# Patient Record
Sex: FEMALE | Race: WHITE | Employment: FULL TIME | URBAN - METROPOLITAN AREA
[De-identification: names, ages, dates, MRNs, and addresses within clinical notes are randomized per-mention and may not be internally consistent; named-entity substitution may affect disease eponyms.]

---

## 2018-08-28 ENCOUNTER — HOSPITAL ENCOUNTER (OUTPATIENT)
Dept: GENERAL RADIOLOGY | Age: 53
Discharge: HOME | End: 2018-08-30
Payer: COMMERCIAL

## 2018-08-28 DIAGNOSIS — Z12.31 VISIT FOR SCREENING MAMMOGRAM: ICD-10-CM

## 2018-08-28 PROCEDURE — 77063 BREAST TOMOSYNTHESIS BI: CPT

## 2018-09-28 PROBLEM — J06.9 VIRAL URI WITH COUGH: Status: ACTIVE | Noted: 2018-09-28

## 2018-11-24 ENCOUNTER — APPOINTMENT (OUTPATIENT)
Dept: GENERAL RADIOLOGY | Age: 53
End: 2018-11-24
Payer: COMMERCIAL

## 2018-11-24 ENCOUNTER — HOSPITAL ENCOUNTER (EMERGENCY)
Age: 53
Discharge: HOME | End: 2018-11-24
Attending: EMERGENCY MEDICINE
Payer: COMMERCIAL

## 2018-11-24 VITALS
OXYGEN SATURATION: 98 % | HEART RATE: 90 BPM | SYSTOLIC BLOOD PRESSURE: 136 MMHG | BODY MASS INDEX: 23.92 KG/M2 | RESPIRATION RATE: 16 BRPM | DIASTOLIC BLOOD PRESSURE: 90 MMHG | HEIGHT: 63 IN | WEIGHT: 135 LBS | TEMPERATURE: 98.3 F

## 2018-11-24 DIAGNOSIS — S60.222A CONTUSION OF LEFT HAND, INITIAL ENCOUNTER: ICD-10-CM

## 2018-11-24 DIAGNOSIS — S61.012A LACERATION OF LEFT THUMB WITHOUT FOREIGN BODY WITHOUT DAMAGE TO NAIL, INITIAL ENCOUNTER: Primary | ICD-10-CM

## 2018-11-24 PROCEDURE — 73130 X-RAY EXAM OF HAND: CPT

## 2018-11-24 PROCEDURE — 12001 RPR S/N/AX/GEN/TRNK 2.5CM/<: CPT

## 2018-11-24 PROCEDURE — 99282 EMERGENCY DEPT VISIT SF MDM: CPT

## 2018-11-24 RX ORDER — NAPROXEN 500 MG/1
500 TABLET ORAL 2 TIMES DAILY
Qty: 14 TABLET | Refills: 0 | Status: SHIPPED | OUTPATIENT
Start: 2018-11-24 | End: 2020-09-17 | Stop reason: ALTCHOICE

## 2018-11-24 RX ORDER — CEPHALEXIN 500 MG/1
500 CAPSULE ORAL 3 TIMES DAILY
Qty: 21 CAPSULE | Refills: 0 | Status: SHIPPED | OUTPATIENT
Start: 2018-11-24 | End: 2018-12-01

## 2018-11-24 RX ORDER — DIAPER,BRIEF,INFANT-TODD,DISP
EACH MISCELLANEOUS
Status: DISCONTINUED
Start: 2018-11-24 | End: 2018-11-24 | Stop reason: HOSPADM

## 2018-11-24 RX ORDER — DIAPER,BRIEF,INFANT-TODD,DISP
EACH MISCELLANEOUS ONCE
Status: DISCONTINUED | OUTPATIENT
Start: 2018-11-24 | End: 2018-11-24 | Stop reason: HOSPADM

## 2018-11-24 RX ORDER — LIDOCAINE HYDROCHLORIDE 10 MG/ML
20 INJECTION, SOLUTION INFILTRATION; PERINEURAL ONCE
Status: DISCONTINUED | OUTPATIENT
Start: 2018-11-24 | End: 2018-11-24 | Stop reason: HOSPADM

## 2018-11-24 RX ORDER — HYDROCODONE BITARTRATE AND ACETAMINOPHEN 5; 325 MG/1; MG/1
1 TABLET ORAL EVERY 6 HOURS PRN
Qty: 6 TABLET | Refills: 0 | Status: SHIPPED | OUTPATIENT
Start: 2018-11-24 | End: 2018-11-26

## 2018-11-24 ASSESSMENT — PAIN DESCRIPTION - LOCATION: LOCATION: HAND

## 2018-11-24 ASSESSMENT — PAIN DESCRIPTION - DESCRIPTORS: DESCRIPTORS: ACHING;CONSTANT;THROBBING;BURNING

## 2018-11-24 ASSESSMENT — PAIN DESCRIPTION - ORIENTATION: ORIENTATION: LEFT

## 2018-11-24 ASSESSMENT — PAIN DESCRIPTION - FREQUENCY: FREQUENCY: CONTINUOUS

## 2018-11-24 ASSESSMENT — PAIN DESCRIPTION - PAIN TYPE: TYPE: ACUTE PAIN

## 2018-11-24 ASSESSMENT — PAIN DESCRIPTION - ONSET: ONSET: SUDDEN

## 2018-11-24 ASSESSMENT — PAIN DESCRIPTION - PROGRESSION: CLINICAL_PROGRESSION: GRADUALLY WORSENING

## 2018-11-24 ASSESSMENT — PAIN SCALES - GENERAL: PAINLEVEL_OUTOF10: 10

## 2018-11-24 NOTE — ED PROVIDER NOTES
PROCEDURE NOTE  11/24/18       Time: 36    LACERATION REPAIR  Risks, benefits and alternatives (for applicable procedures below) described. Performed By: LEIGHTON Coulter CNP. Laceration #: 1. Location: left thumb dorum  Length: 1 cm. The wound area was cleansend with shur-clens and draped in a sterile fashion. Local Anesthesia:  Lidocaine 1% without epinephrine. The wound was explored with the following results:  no foreign body or tendon injury seen. Debridement: None. Undermining: None. Wound Margins Revised: None. Flaps Aligned: no. The wound was closed with 4-0 Ethilon using interrupted sutures. Dressing:  bacitracin, a sterile dressing and a bandage was placed. Total number suture:  2. There were no additional lacerations requiring repair. Patient tolerated well. LEIGHTON Coulter CNP  11/24/18 1635  Attending; patient sustained injury to her left thumb when she was shooting a gun and shooting range. Physical examination finds linear laceration dorsal surface left thumb at the IP joint approximately 1.5 cm minimal bleeding. There is no bony deformity. Distal circulatory North examinations intact. There are no other injuries. The wound was anesthetized cleansed and sutured by the mid-level provider. Patient was discharged with antibiotics to be given tetanus if needed     HPI:  3/24/19,   Time: 10:08 AM         Danica Beckett is a 46 y.o. female presenting to the ED for thumb injury, beginning just prior to arrival ago. The complaint has been persistent, moderate in severity, and worsened by nothing. Sustaining injury to thumb while using a gun and gun range. Presents with abrasion laceration of thumb.   No other injuries    ROS:   Pertinent positives and negatives are stated within HPI, all other systems reviewed and are negative.  --------------------------------------------- PAST HISTORY ---------------------------------------------  Past Medical History:  has a past medical history of Asthma, Depression, GERD (gastroesophageal reflux disease), Scoliosis, and Obrien-Parkinson-White syndrome. Past Surgical History:  has a past surgical history that includes Hysterectomy;  section; and cyst removal (). Social History:  reports that she has never smoked. She has never used smokeless tobacco. She reports that she does not drink alcohol or use drugs. Family History: family history includes Asthma in her brother; Atopy in her son; Cancer in her mother; Other in her father and sister. The patients home medications have been reviewed. Allergies: Patient has no known allergies. -------------------------------------------------- RESULTS -------------------------------------------------  All laboratory and radiology results have been personally reviewed by myself   LABS:  No results found for this visit on 18. RADIOLOGY:  Interpreted by Radiologist.  XR HAND LEFT (MIN 3 VIEWS)   Final Result   1. More likely soft tissue injury in the base of the thumb to be   correlated with the clinical data. 2. No intrinsic bone or joint abnormality.          ------------------------- NURSING NOTES AND VITALS REVIEWED ---------------------------   The nursing notes within the ED encounter and vital signs as below have been reviewed. BP (!) 136/90   Pulse 90   Temp 98.3 °F (36.8 °C) (Oral)   Resp 16   Ht 5' 3\" (1.6 m)   Wt 135 lb (61.2 kg)   SpO2 98%   BMI 23.91 kg/m²   Oxygen Saturation Interpretation: Normal      ---------------------------------------------------PHYSICAL EXAM--------------------------------------    Constitutional/General: Alert and oriented x3, well appearing, non toxic in NAD  Head: NC/AT  Eyes: PERRL, EOMI  Mouth: Oropharynx clear, handling secretions, no trismus  Neck: Supple, full ROM, no meningeal signs  Pulmonary: Lungs clear to auscultation bilaterally, no wheezes, rales, or rhonchi.  Not in respiratory

## 2019-09-13 PROBLEM — M41.34 THORACOGENIC SCOLIOSIS OF THORACIC REGION: Status: ACTIVE | Noted: 2019-09-13

## 2019-09-13 PROBLEM — J06.9 VIRAL URI WITH COUGH: Status: RESOLVED | Noted: 2018-09-28 | Resolved: 2019-09-13

## 2019-09-17 ENCOUNTER — HOSPITAL ENCOUNTER (OUTPATIENT)
Dept: GENERAL RADIOLOGY | Age: 54
Discharge: HOME | End: 2019-09-19
Payer: COMMERCIAL

## 2019-09-17 DIAGNOSIS — Z12.31 VISIT FOR SCREENING MAMMOGRAM: ICD-10-CM

## 2019-09-17 PROCEDURE — 77063 BREAST TOMOSYNTHESIS BI: CPT

## 2020-09-18 ENCOUNTER — HOSPITAL ENCOUNTER (OUTPATIENT)
Dept: GENERAL RADIOLOGY | Age: 55
Discharge: HOME | End: 2020-09-20
Payer: COMMERCIAL

## 2020-09-18 PROCEDURE — 77063 BREAST TOMOSYNTHESIS BI: CPT

## 2020-10-08 ENCOUNTER — HOSPITAL ENCOUNTER (OUTPATIENT)
Age: 55
Discharge: HOME | End: 2020-10-10
Payer: COMMERCIAL

## 2020-10-08 ENCOUNTER — HOSPITAL ENCOUNTER (OUTPATIENT)
Dept: GENERAL RADIOLOGY | Age: 55
Discharge: HOME | End: 2020-10-10
Payer: COMMERCIAL

## 2020-10-08 PROCEDURE — 73080 X-RAY EXAM OF ELBOW: CPT

## 2020-12-17 LAB
ALBUMIN SERPL-MCNC: NORMAL G/DL
ALP BLD-CCNC: NORMAL U/L
ALT SERPL-CCNC: NORMAL U/L
ANION GAP SERPL CALCULATED.3IONS-SCNC: NORMAL MMOL/L
AST SERPL-CCNC: NORMAL U/L
BILIRUB SERPL-MCNC: NORMAL MG/DL
BUN BLDV-MCNC: NORMAL MG/DL
CALCIUM SERPL-MCNC: NORMAL MG/DL
CHLORIDE BLD-SCNC: NORMAL MMOL/L
CHOLESTEROL, TOTAL: NORMAL
CHOLESTEROL/HDL RATIO: NORMAL
CO2: NORMAL
CREAT SERPL-MCNC: NORMAL MG/DL
GFR CALCULATED: NORMAL
GLUCOSE BLD-MCNC: NORMAL MG/DL
HDLC SERPL-MCNC: NORMAL MG/DL
LDL CHOLESTEROL CALCULATED: NORMAL
NONHDLC SERPL-MCNC: NORMAL MG/DL
POTASSIUM SERPL-SCNC: NORMAL MMOL/L
SODIUM BLD-SCNC: NORMAL MMOL/L
TOTAL PROTEIN: NORMAL
TRIGL SERPL-MCNC: NORMAL MG/DL
VLDLC SERPL CALC-MCNC: NORMAL MG/DL

## 2021-02-01 ENCOUNTER — OFFICE VISIT (OUTPATIENT)
Dept: ORTHOPEDIC SURGERY | Age: 56
End: 2021-02-01
Payer: COMMERCIAL

## 2021-02-01 VITALS — BODY MASS INDEX: 22.86 KG/M2 | TEMPERATURE: 98 F | WEIGHT: 129 LBS | HEIGHT: 63 IN

## 2021-02-01 DIAGNOSIS — M77.01 MEDIAL EPICONDYLITIS, RIGHT ELBOW: Primary | ICD-10-CM

## 2021-02-01 PROCEDURE — 99203 OFFICE O/P NEW LOW 30 MIN: CPT | Performed by: ORTHOPAEDIC SURGERY

## 2021-02-01 RX ORDER — DEXLANSOPRAZOLE 60 MG/1
60 CAPSULE, DELAYED RELEASE ORAL DAILY
COMMUNITY
Start: 2021-01-30

## 2021-02-01 RX ORDER — MECLIZINE HYDROCHLORIDE 25 MG/1
TABLET ORAL
COMMUNITY
Start: 2021-01-07

## 2021-02-01 NOTE — PROGRESS NOTES
Kinsey Camacho is a 63 y.o. female, who presents   Chief Complaint   Patient presents with    Elbow Injury     right medial elbow has cortisone injections in it. pain is radiating from the elbow down to the fingers        HPI[de-identified] Right medial elbow pain is been present for about 6 months. Patient has no history of inciting incident or accidents. She did have an aggravation of this area a few weeks ago when she was trying to get into work and the door was magnetically latched. She has difficulty with some activities of daily living such as gripping lifting or twisting. She indicates pain in the medial proximal forearm and also elbow. Previous treatments have included a strap which she was wearing today, nonsteroidal oral anti-inflammatory medications, a corticosteroid Dosepak which unfortunately did not help her and to local corticosteroid injections. 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 Allergies; medications; past medical, surgical, family, and social history; and problem list have been reviewed today and updated as indicated in this encounter - see below following Ortho specifics. Musculoskeletal: Skin condition gross neurovascular function are good in right upper extremity. Right shoulder range of motion is good as are wrist and hand motion. She guards right elbow range of motion though it is full from 0-100 and 20+ degrees of flexion. She has significant tenderness to palpation in the medial area of the elbow over the epicondyles and the flexor muscle tendons proximally. She has little if any tenderness laterally. She has discomfort with resisted manual testing in any motion that activates the flexors in the forearm. There is no discoloration and minimal if any edema. There is no crepitus or instability. Radiologic Studies: Imaging in multiple views shows normal bony structures and soft tissue contours in the right elbow.     ASSESSMENT:  Roxane Boucher was seen today for elbow injury. Diagnoses and all orders for this visit:    Medial epicondylitis, right elbow     Treatment alternatives were reviewed including medical and physical therapies, injections, and surgical options, expected risks benefits and likely outcome of each were discussed in detail, questions asked and answered and understood. We discussed the symptoms as well as physical findings and imaging results. This is consistent with flexor strain and medial epicondylitis of the right elbow. PLAN: The remaining conservative measure would be physical therapy which helps the majority of patients with this problem though it tends to take some time which can be discouraging. Surgical treatment as last resort and is not considered at this time. We will get her in physical therapy and follow-up in 3 to 4 weeks to assess progress.         Patient Active Problem List   Diagnosis    Mild persistent asthma without complication    Perennial allergic rhinitis with seasonal variation    Thoracic scoliosis    GERD with stricture    Depression    Restrictive lung disease    Thoracogenic scoliosis of thoracic region       Past Medical History:   Diagnosis Date    Asthma     Depression     GERD (gastroesophageal reflux disease)     Scoliosis     Obrien-Parkinson-White syndrome        Past Surgical History:   Procedure Laterality Date     SECTION      CYST REMOVAL      cyst removal from head    HYSTERECTOMY         Current Outpatient Medications   Medication Sig Dispense Refill    DEXILANT 60 MG CPDR delayed release capsule       meclizine (ANTIVERT) 25 MG tablet TAKE 1 TABLET BY MOUTH 3 TIMES A DAY AS NEEDED DIZZINESS      budesonide-formoterol (SYMBICORT) 80-4.5 MCG/ACT AERO USE 2 INHALATIONS ORALLY   TWICE DAILY 3 Inhaler 3    Baloxavir Marboxil,40 MG Dose, (XOFLUZA) 2 x 20 MG TBPK 2 tablets once for complete dose of therapy 1 each 2    montelukast (SINGULAIR) 10 MG tablet Take 1 tablet by mouth nightly 30 tablet 5    budesonide-formoterol (SYMBICORT) 160-4.5 MCG/ACT AERO Inhale 2 puffs into the lungs 2 times daily 2 inhalations twice a day as a step-up      fluticasone (VERAMYST) 27.5 MCG/SPRAY nasal spray 2 sprays by Nasal route daily      atorvastatin (LIPITOR) 40 MG TABS Take 40 mg by mouth daily       Cholecalciferol (VITAMIN D) 2000 UNITS CAPS capsule Take  by mouth daily.  venlafaxine (EFFEXOR) 75 MG tablet Take 37.5 mg by mouth daily        No current facility-administered medications for this visit.         Allergies   Allergen Reactions    Dog Epithelium Allergy Skin Test Itching     All animals       Social History     Socioeconomic History    Marital status:      Spouse name: None    Number of children: None    Years of education: None    Highest education level: None   Occupational History    None   Social Needs    Financial resource strain: None    Food insecurity     Worry: None     Inability: None    Transportation needs     Medical: None     Non-medical: None   Tobacco Use    Smoking status: Never Smoker    Smokeless tobacco: Never Used   Substance and Sexual Activity    Alcohol use: No    Drug use: No    Sexual activity: Yes     Partners: Male   Lifestyle    Physical activity     Days per week: None     Minutes per session: None    Stress: None   Relationships    Social connections     Talks on phone: None     Gets together: None     Attends Buddhist service: None     Active member of club or organization: None     Attends meetings of clubs or organizations: None     Relationship status: None    Intimate partner violence     Fear of current or ex partner: None     Emotionally abused: None     Physically abused: None     Forced sexual activity: None   Other Topics Concern    None   Social History Narrative    None       Family History   Problem Relation Age of Onset    Cancer Mother         living - breast cancer    Other Father         living and healthy    Other Sister         2 sisters living and healthy    Asthma Brother         living    Atopy Son         living         Review of Systems:   As follows except as previously noted in HPI:  Constitutional: Negative for chills, diaphoresis,  fever   Respiratory: Negative for cough, shortness of breath and wheezing. Cardiovascular: Negative for chest pain and palpitations. Neurological: Negative for dizziness, syncope,   GI / : abdominal pain or cramping  Musculoskeletal: see HPI       Objective:   Physical Exam   Constitutional: Oriented to person, place, and time. and appears well-developed and well-nourished. :   Head: Normocephalic and atraumatic. Neck: Neck supple. Eyes: EOM are normal.   Pulmonary/Chest: Effort normal.  No respiratory distress, no wheezes. Neurological: Alert and oriented to person  Skin: Skin is warm and dry. Ruddy Sullivan DO    2/1/21  11:25 AM    All reasonable efforts have been made to minimize the risk of errors that may occur in the use of voice recognition and other electronic means of charting.

## 2021-03-01 ENCOUNTER — OFFICE VISIT (OUTPATIENT)
Dept: ORTHOPEDIC SURGERY | Age: 56
End: 2021-03-01
Payer: COMMERCIAL

## 2021-03-01 VITALS — WEIGHT: 129 LBS | TEMPERATURE: 98 F | BODY MASS INDEX: 22.86 KG/M2 | HEIGHT: 63 IN

## 2021-03-01 DIAGNOSIS — M77.01 MEDIAL EPICONDYLITIS, RIGHT ELBOW: Primary | ICD-10-CM

## 2021-03-01 PROCEDURE — 99213 OFFICE O/P EST LOW 20 MIN: CPT | Performed by: ORTHOPAEDIC SURGERY

## 2021-03-01 NOTE — PROGRESS NOTES
Chief Complaint:   Chief Complaint   Patient presents with    Elbow Pain     Right Elbow, F/U after PT, with minimal relief       Tuyet Colunga follows up for medial epicondylar elbow pain. She continues have discomfort there. She has been in physical therapy for a few weeks. They have tried cupping techniques as well as other things. They apparently told her that she needs to be strengthen up to help this go away. The next thing they are going to try is dry needling. Symptoms vary in some of its related to use. Allergies; medications; past medical, surgical, family, and social history; and problem list have been reviewed today and updated as indicated in this encounter seen below. Exam: Skin condition gross neurovascular function are good in right upper extremity. Elbow motion is good. Her carrying angle is significant but not abnormal for her gender. Stability in the elbow is good. There is no crepitus. There is distinct point tenderness to palpation over the medial epicondylar prominence. She also has a little discomfort with muscle testing which activates the flexors of the hand and wrist.  The ulnar nerve does not subluxate or feel excessively tight in the groove. Radiographs: None    ASSESSMENT:    Prince Loya was seen today for elbow pain. Diagnoses and all orders for this visit:    Medial epicondylitis, right elbow        PLAN: Continue with the therapy and work through the modalities. At this point injection would introduce artifact and make it difficult to see if other treatments had helped. She agrees that surgery is a last resort. We will follow-up in about 3 weeks. Return in about 3 weeks (around 3/22/2021).        Current Outpatient Medications   Medication Sig Dispense Refill    DEXILANT 60 MG CPDR delayed release capsule       meclizine (ANTIVERT) 25 MG tablet TAKE 1 TABLET BY MOUTH 3 TIMES A DAY AS NEEDED DIZZINESS      budesonide-formoterol (SYMBICORT) 80-4.5 MCG/ACT AERO USE 2 INHALATIONS ORALLY   TWICE DAILY 3 Inhaler 3    Baloxavir Marboxil,40 MG Dose, (XOFLUZA) 2 x 20 MG TBPK 2 tablets once for complete dose of therapy 1 each 2    montelukast (SINGULAIR) 10 MG tablet Take 1 tablet by mouth nightly 30 tablet 5    budesonide-formoterol (SYMBICORT) 160-4.5 MCG/ACT AERO Inhale 2 puffs into the lungs 2 times daily 2 inhalations twice a day as a step-up      fluticasone (VERAMYST) 27.5 MCG/SPRAY nasal spray 2 sprays by Nasal route daily      atorvastatin (LIPITOR) 40 MG TABS Take 40 mg by mouth daily       Cholecalciferol (VITAMIN D) 2000 UNITS CAPS capsule Take  by mouth daily.  venlafaxine (EFFEXOR) 75 MG tablet Take 37.5 mg by mouth daily        No current facility-administered medications for this visit.         Patient Active Problem List   Diagnosis    Mild persistent asthma without complication    Perennial allergic rhinitis with seasonal variation    Thoracic scoliosis    GERD with stricture    Depression    Restrictive lung disease    Thoracogenic scoliosis of thoracic region       Past Medical History:   Diagnosis Date    Asthma     Depression     GERD (gastroesophageal reflux disease)     Scoliosis     Obrien-Parkinson-White syndrome        Past Surgical History:   Procedure Laterality Date     SECTION      CYST REMOVAL      cyst removal from head    HYSTERECTOMY         Allergies   Allergen Reactions    Dog Epithelium Allergy Skin Test Itching     All animals       Social History     Socioeconomic History    Marital status:      Spouse name: None    Number of children: None    Years of education: None    Highest education level: None   Occupational History    None   Social Needs    Financial resource strain: None    Food insecurity     Worry: None     Inability: None    Transportation needs     Medical: None     Non-medical: None   Tobacco Use    Smoking status: Never Smoker    Smokeless tobacco: Never Used Substance and Sexual Activity    Alcohol use: No    Drug use: No    Sexual activity: Yes     Partners: Male   Lifestyle    Physical activity     Days per week: None     Minutes per session: None    Stress: None   Relationships    Social connections     Talks on phone: None     Gets together: None     Attends [employer] service: None     Active member of club or organization: None     Attends meetings of clubs or organizations: None     Relationship status: None    Intimate partner violence     Fear of current or ex partner: None     Emotionally abused: None     Physically abused: None     Forced sexual activity: None   Other Topics Concern    None   Social History Narrative    None       Review of Systems  As follows except as previously noted in HPI:  Constitutional: Negative for chills, diaphoresis, fatigue, fever and unexpected weight change. Respiratory: Negative for cough, shortness of breath and wheezing. Cardiovascular: Negative for chest pain and palpitations. Neurological: Negative for dizziness, syncope, cephalgia. GI / : negative  Musculoskeletal: see HPI       Objective:   Physical Exam   Constitutional: Oriented to person, place, and time. and appears well-developed and well-nourished. :   Head: Normocephalic and atraumatic. Eyes: EOM are normal.   Neck: Neck supple. Cardiovascular: Normal rate and regular rhythm. Pulmonary/Chest: Effort normal. No stridor. No respiratory distress, no wheezes. Abdominal:  No abnormal distension. Neurological: Alert and oriented to person, place, and time. Skin: Skin is warm and dry. Psychiatric: Normal mood and affect.  Behavior is normal. Thought content normal.    TERESA Sanders DO    3/1/21  8:19 AM

## 2021-03-22 ENCOUNTER — OFFICE VISIT (OUTPATIENT)
Dept: ORTHOPEDIC SURGERY | Age: 56
End: 2021-03-22
Payer: COMMERCIAL

## 2021-03-22 VITALS — WEIGHT: 135 LBS | HEIGHT: 63 IN | TEMPERATURE: 98 F | BODY MASS INDEX: 23.92 KG/M2

## 2021-03-22 DIAGNOSIS — M77.01 MEDIAL EPICONDYLITIS, RIGHT ELBOW: Primary | ICD-10-CM

## 2021-03-22 PROCEDURE — 99213 OFFICE O/P EST LOW 20 MIN: CPT | Performed by: ORTHOPAEDIC SURGERY

## 2021-03-22 NOTE — PROGRESS NOTES
Chief Complaint:   Chief Complaint   Patient presents with    Elbow Pain     right elbow pain with no relief       Tuyet BINGHAM Keanu follows up for her medial right elbow pain. Unfortunately it is persisted. She has been through couple months of physical therapy to no avail. She has had 2 previous injections in the tendon area there. She is able to function during the day mainly with the strap but still has pain. This is something that she would like to get rid of with regards to the pain and debility. Allergies; medications; past medical, surgical, family, and social history; and problem list have been reviewed today and updated as indicated in this encounter seen below. Exam: There is good range of motion in the right elbow though she has little discomfort with full flexion. There is no instability. She has full forearm rotation. She has good distal strength with some discomfort with some resisted manual testing involving the flexor tendon group of the forearm. She has distinct tenderness to palpation over the medial epicondyle and withdraws. Radiographs: Previous imaging shows no gross abnormality about the elbow. ASSESSMENT:    Mary Zimmerman was seen today for elbow pain. Diagnoses and all orders for this visit:    Medial epicondylitis, right elbow        PLAN: We discussed the treatment cascade which she is essentially been through all of except surgery. She is entertaining surgery at this time if that is the only thing that might help. It seems all other possibilities have been exhausted. We did discuss surgical treatment and I will review current literature to see if anything is changed that might be promising and then discussed the options with her once again. No follow-ups on file.        Current Outpatient Medications   Medication Sig Dispense Refill    DEXILANT 60 MG CPDR delayed release capsule       meclizine (ANTIVERT) 25 MG tablet TAKE 1 TABLET BY MOUTH 3 TIMES A DAY AS NEEDED status: Never Smoker    Smokeless tobacco: Never Used   Substance and Sexual Activity    Alcohol use: No    Drug use: No    Sexual activity: Yes     Partners: Male   Lifestyle    Physical activity     Days per week: None     Minutes per session: None    Stress: None   Relationships    Social connections     Talks on phone: None     Gets together: None     Attends Scientology service: None     Active member of club or organization: None     Attends meetings of clubs or organizations: None     Relationship status: None    Intimate partner violence     Fear of current or ex partner: None     Emotionally abused: None     Physically abused: None     Forced sexual activity: None   Other Topics Concern    None   Social History Narrative    None       Review of Systems  As follows except as previously noted in HPI:  Constitutional: Negative for chills, diaphoresis, fatigue, fever and unexpected weight change. Respiratory: Negative for cough, shortness of breath and wheezing. Cardiovascular: Negative for chest pain and palpitations. Neurological: Negative for dizziness, syncope, cephalgia. GI / : negative  Musculoskeletal: see HPI       Objective:   Physical Exam   Constitutional: Oriented to person, place, and time. and appears well-developed and well-nourished. :   Head: Normocephalic and atraumatic. Eyes: EOM are normal.   Neck: Neck supple. Cardiovascular: Normal rate and regular rhythm. Pulmonary/Chest: Effort normal. No stridor. No respiratory distress, no wheezes. Abdominal:  No abnormal distension. Neurological: Alert and oriented to person, place, and time. Skin: Skin is warm and dry. Psychiatric: Normal mood and affect.  Behavior is normal. Thought content normal.    TERESA Rios DO    3/22/21  8:37 AM

## 2021-03-30 ENCOUNTER — IMMUNIZATION (OUTPATIENT)
Dept: PRIMARY CARE CLINIC | Age: 56
End: 2021-03-30
Payer: COMMERCIAL

## 2021-03-30 PROCEDURE — 0011A COVID-19, MODERNA VACCINE 100MCG/0.5ML DOSE: CPT | Performed by: NURSE PRACTITIONER

## 2021-03-30 PROCEDURE — 91301 COVID-19, MODERNA VACCINE 100MCG/0.5ML DOSE: CPT | Performed by: NURSE PRACTITIONER

## 2021-04-07 ENCOUNTER — OFFICE VISIT (OUTPATIENT)
Dept: ORTHOPEDIC SURGERY | Age: 56
End: 2021-04-07
Payer: COMMERCIAL

## 2021-04-07 VITALS — TEMPERATURE: 98 F | WEIGHT: 135 LBS | BODY MASS INDEX: 23.92 KG/M2 | HEIGHT: 63 IN

## 2021-04-07 DIAGNOSIS — M77.01 MEDIAL EPICONDYLITIS, RIGHT ELBOW: Primary | ICD-10-CM

## 2021-04-07 PROCEDURE — 99213 OFFICE O/P EST LOW 20 MIN: CPT | Performed by: ORTHOPAEDIC SURGERY

## 2021-04-07 NOTE — PROGRESS NOTES
alternatives including postponing the procedure were discussed. The patient does wish to proceed with the procedure at this time. No follow-ups on file. Current Outpatient Medications   Medication Sig Dispense Refill    DEXILANT 60 MG CPDR delayed release capsule       meclizine (ANTIVERT) 25 MG tablet TAKE 1 TABLET BY MOUTH 3 TIMES A DAY AS NEEDED DIZZINESS      budesonide-formoterol (SYMBICORT) 80-4.5 MCG/ACT AERO USE 2 INHALATIONS ORALLY   TWICE DAILY 3 Inhaler 3    Baloxavir Marboxil,40 MG Dose, (XOFLUZA) 2 x 20 MG TBPK 2 tablets once for complete dose of therapy 1 each 2    montelukast (SINGULAIR) 10 MG tablet Take 1 tablet by mouth nightly 30 tablet 5    budesonide-formoterol (SYMBICORT) 160-4.5 MCG/ACT AERO Inhale 2 puffs into the lungs 2 times daily 2 inhalations twice a day as a step-up      fluticasone (VERAMYST) 27.5 MCG/SPRAY nasal spray 2 sprays by Nasal route daily      atorvastatin (LIPITOR) 40 MG TABS Take 40 mg by mouth daily       Cholecalciferol (VITAMIN D) 2000 UNITS CAPS capsule Take  by mouth daily.  venlafaxine (EFFEXOR) 75 MG tablet Take 37.5 mg by mouth daily        No current facility-administered medications for this visit.         Patient Active Problem List   Diagnosis    Mild persistent asthma without complication    Perennial allergic rhinitis with seasonal variation    Thoracic scoliosis    GERD with stricture    Depression    Restrictive lung disease    Thoracogenic scoliosis of thoracic region       Past Medical History:   Diagnosis Date    Asthma     Depression     GERD (gastroesophageal reflux disease)     Scoliosis     Obrien-Parkinson-White syndrome        Past Surgical History:   Procedure Laterality Date     SECTION      CYST REMOVAL      cyst removal from head    HYSTERECTOMY         Allergies   Allergen Reactions    Dog Epithelium Allergy Skin Test Itching     All animals       Social History     Socioeconomic History oriented to person, place, and time. Skin: Skin is warm and dry. Psychiatric: Normal mood and affect.  Behavior is normal. Thought content normal.    TERESA Rock DO    4/7/21  3:48 PM

## 2021-04-09 ENCOUNTER — HOSPITAL ENCOUNTER (OUTPATIENT)
Age: 56
Discharge: HOME | End: 2021-04-11

## 2021-04-09 ENCOUNTER — HOSPITAL ENCOUNTER (OUTPATIENT)
Age: 56
Discharge: HOME | End: 2021-04-09
Payer: COMMERCIAL

## 2021-04-09 DIAGNOSIS — M77.01 MEDIAL EPICONDYLITIS OF RIGHT ELBOW: ICD-10-CM

## 2021-04-09 LAB
EKG ATRIAL RATE: 63 BPM
EKG P AXIS: 52 DEGREES
EKG P-R INTERVAL: 130 MS
EKG Q-T INTERVAL: 394 MS
EKG QRS DURATION: 90 MS
EKG QTC CALCULATION (BAZETT): 403 MS
EKG R AXIS: 61 DEGREES
EKG T AXIS: 31 DEGREES
EKG VENTRICULAR RATE: 63 BPM

## 2021-04-09 PROCEDURE — 93005 ELECTROCARDIOGRAM TRACING: CPT | Performed by: ANESTHESIOLOGY

## 2021-04-09 PROCEDURE — U0003 INFECTIOUS AGENT DETECTION BY NUCLEIC ACID (DNA OR RNA); SEVERE ACUTE RESPIRATORY SYNDROME CORONAVIRUS 2 (SARS-COV-2) (CORONAVIRUS DISEASE [COVID-19]), AMPLIFIED PROBE TECHNIQUE, MAKING USE OF HIGH THROUGHPUT TECHNOLOGIES AS DESCRIBED BY CMS-2020-01-R: HCPCS

## 2021-04-09 PROCEDURE — U0005 INFEC AGEN DETEC AMPLI PROBE: HCPCS

## 2021-04-10 LAB
SARS-COV-2: NOT DETECTED
SOURCE: NORMAL

## 2021-04-12 ENCOUNTER — OFFICE VISIT (OUTPATIENT)
Dept: CARDIOLOGY CLINIC | Age: 56
End: 2021-04-12
Payer: COMMERCIAL

## 2021-04-12 VITALS
RESPIRATION RATE: 18 BRPM | WEIGHT: 134 LBS | BODY MASS INDEX: 23.74 KG/M2 | DIASTOLIC BLOOD PRESSURE: 62 MMHG | HEIGHT: 63 IN | HEART RATE: 71 BPM | OXYGEN SATURATION: 96 % | SYSTOLIC BLOOD PRESSURE: 112 MMHG

## 2021-04-12 DIAGNOSIS — Z01.810 PREOP CARDIOVASCULAR EXAM: Primary | ICD-10-CM

## 2021-04-12 DIAGNOSIS — I45.6 WOLFF-PARKINSON-WHITE (WPW) PATTERN: ICD-10-CM

## 2021-04-12 PROCEDURE — 99212 OFFICE O/P EST SF 10 MIN: CPT | Performed by: CLINICAL NURSE SPECIALIST

## 2021-04-12 PROCEDURE — 93000 ELECTROCARDIOGRAM COMPLETE: CPT | Performed by: INTERNAL MEDICINE

## 2021-04-12 NOTE — PROGRESS NOTES
OFFICE VISIT        PRIMARY CARE PHYSICIAN:      Yvette Castellanos DO       ALLERGIES / SENSITIVITIES:        Allergies   Allergen Reactions    Dog Epithelium Allergy Skin Test Itching     All animals          REVIEWED MEDICATIONS:        Current Outpatient Medications:     DEXILANT 60 MG CPDR delayed release capsule, Take 60 mg by mouth daily , Disp: , Rfl:     budesonide-formoterol (SYMBICORT) 80-4.5 MCG/ACT AERO, USE 2 INHALATIONS ORALLY   TWICE DAILY, Disp: 3 Inhaler, Rfl: 3    montelukast (SINGULAIR) 10 MG tablet, Take 1 tablet by mouth nightly, Disp: 30 tablet, Rfl: 5    atorvastatin (LIPITOR) 40 MG TABS, Take 40 mg by mouth daily , Disp: , Rfl:     Cholecalciferol (VITAMIN D) 2000 UNITS CAPS capsule, Take  by mouth daily. , Disp: , Rfl:     venlafaxine (EFFEXOR) 75 MG tablet, Take 75 mg by mouth daily , Disp: , Rfl:     meclizine (ANTIVERT) 25 MG tablet, TAKE 1 TABLET BY MOUTH 3 TIMES A DAY AS NEEDED DIZZINESS, Disp: , Rfl:     fluticasone (VERAMYST) 27.5 MCG/SPRAY nasal spray, 2 sprays by Nasal route daily, Disp: , Rfl:         S: REASON FOR VISIT:     Preoperative cardiac risk assessment. Carlee Murrell is a pleasant 63year old lady who is followed here by Dr. Rashmi Dela Cruz. She has a history of Jayson-Parkinson-White syndrome and as a history of asthma and mild restricted extra parenchymal mechanics related to dorsal dextro-scoliosis as diagnosed by her pulmonologist, Dr. Anthony Brody. She has been under the care of orthopedics due to \"golfer's elbow\" and is scheduled for surgery on Thursday, April 15. She has a functional capacity of more than 4 METS and denies chest pain, palpitations, or exertional dyspnea. She denies orthopnea, PND's, lower extremity swelling, dizziness, presyncope or syncope.     She last had anesthesia early this year for EGD and tolerated it well and has never had complications from anesthesia in the past. had a stress test and an echocardiogram done on 11/15/2016, both of which were unremarkable. REVIEW OF SYSTEMS:    CONSTITUTIONAL: Denies fevers, chills, night sweats or fatigue. HEENT: Denies any recent changes in hearing or vision, headaches, or dysphagia. ENDOCRINE: Denies polyphagia, polydipsia or polyuria. Denies heat or cold intolerance. MUSCULOSKELETAL: She has arthritis in her hands for which she takes Voltaren. Right elbow pain as above. SKIN: Denies rashes, ulcers or itching. HEME/LYMPH: Denies any palpable lymph nodes, bleeding or easy bruisability. HEART: As above. LUNGS: Denies any significant cough or sputum production. GI: Acid reflux symptoms. Denies nausea, vomiting,diarrhea, constipation, rectal bleeding or tarry stools. : Denies hematuria or dysuria. PSYCHIATRIC: History of depression but denies any recent mood changes or anxiety. NEUROLOGIC: Denies memory loss, motor weakness, numbness, tingling or tremors.       CARDIOVASCULAR HISTORY:   1. Jayson-Parkinson-White syndrome. 2.  Treadmill nuclear stress test, 11/15/2016, Bladimir protocol. 7 minutes. 91% of the maximum predicted heart rate. Physiologic blood pressure response. No chest pain. No ischemic EKG changes. No arrhythmias. Nuclear images were within normal limits with no evidence of scars or stress-induced ischemia and with a computer calculated ejection fraction of 81%. 3.  Echocardiogram done on 11/15/2016 showed normal left ventricular size, wall thickness, wall motion and systolic function with an ejection fraction estimated at 65% with stage 1 left ventricular diastolic dysfunction, normal right ventricular size and function, trivial aortic regurgitation.       PAST MEDICAL HISTORY:  1. As under cardiovascular history. 2. Asthma. 3. Dextro scoliosis with possible mild restrictive lung disease as a result. 4. History of  in .  5. History of hysterectomy (one ovary left) in . 6. History of left breast cyst, status post biopsy: Benign. 7. Acid reflux/GERD.   8. [facility] [id number] ([place]).  [facility] [id number]  [phone]

## 2021-04-14 ENCOUNTER — ANESTHESIA EVENT (OUTPATIENT)
Dept: OPERATING ROOM | Age: 56
End: 2021-04-14
Payer: COMMERCIAL

## 2021-04-14 NOTE — ANESTHESIA PRE PROCEDURE
Allergies: Allergies   Allergen Reactions    Dog Epithelium Allergy Skin Test Itching     All animals       Problem List:    Patient Active Problem List   Diagnosis Code    Mild persistent asthma without complication W20.88    Perennial allergic rhinitis with seasonal variation J30.89, J30.2    Thoracic scoliosis M41.9    GERD with stricture K21.9, K22.2    Depression F32.9    Restrictive lung disease J98.4    Thoracogenic scoliosis of thoracic region M41.34       Past Medical History:        Diagnosis Date    Asthma     Depression     GERD (gastroesophageal reflux disease)     Hyperlipidemia     Scoliosis     Obrien-Parkinson-White syndrome     onset age 28's  no problems in last 10 yrs       Past Surgical History:        Procedure Laterality Date     SECTION      CYST REMOVAL      cyst removal from head    HYSTERECTOMY      SHOULDER SURGERY      manipulation       Social History:    Social History     Tobacco Use    Smoking status: Never Smoker    Smokeless tobacco: Never Used   Substance Use Topics    Alcohol use: Yes     Comment: rare                                Counseling given: Not Answered      Vital Signs (Current):   Vitals:    21 0931   Weight: 135 lb (61.2 kg)   Height: 5' 3\" (1.6 m)                                              BP Readings from Last 3 Encounters:   21 112/62   20 110/62   19 102/60       NPO Status:  >8.H                                                                               BMI:   Wt Readings from Last 3 Encounters:   21 134 lb (60.8 kg)   21 135 lb (61.2 kg)   21 135 lb (61.2 kg)     Body mass index is 23.91 kg/m².     CBC:   Lab Results   Component Value Date    WBC 9.3 2016    RBC 5.09 2016    HGB 13.6 2016    HCT 41.2 2016    MCV 80.9 2016    RDW 12.8 2016     2016       CMP:   Lab Results   Component Value Date     2016    K 3.4 2016  11/05/2016    CO2 29 11/05/2016    BUN 12 11/05/2016    CREATININE 0.9 11/05/2016    GFRAA >60 11/05/2016    LABGLOM >60 11/05/2016    GLUCOSE 102 11/05/2016    GLUCOSE 82 05/23/2011    PROT 7.3 01/26/2015    CALCIUM 9.8 11/05/2016    BILITOT 0.2 01/26/2015    ALKPHOS 130 01/26/2015    AST 21 01/26/2015    ALT 12 01/26/2015       POC Tests: No results for input(s): POCGLU, POCNA, POCK, POCCL, POCBUN, POCHEMO, POCHCT in the last 72 hours. Coags: No results found for: PROTIME, INR, APTT    HCG (If Applicable):   Lab Results   Component Value Date    PREGTESTUR negative 11/24/2012        ABGs: No results found for: PHART, PO2ART, UMC0TAO, DUT2AIV, BEART, I9OXUSNN     Type & Screen (If Applicable):  No results found for: LABABO, LABRH    Drug/Infectious Status (If Applicable):  No results found for: HIV, HEPCAB    COVID-19 Screening (If Applicable):   Lab Results   Component Value Date    COVID19 Not Detected 04/09/2021           Anesthesia Evaluation  Patient summary reviewed no history of anesthetic complications:   Airway: Mallampati: I  TM distance: >3 FB   Neck ROM: full  Mouth opening: > = 3 FB Dental:          Pulmonary: breath sounds clear to auscultation  (+) asthma:     (-) not a current smoker                           Cardiovascular:  Exercise tolerance: good (>4 METS),         ECG reviewed  Rhythm: regular  Rate: normal                 ROS comment: Cardiac clearance in the chart, normal EKG. Neuro/Psych:   (+) psychiatric history:            GI/Hepatic/Renal:   (+) GERD:,           Endo/Other:                     Abdominal:   (+) scaphoid        Vascular:                                      Anesthesia Plan      general     ASA 3       Induction: intravenous. BIS  MIPS: Postoperative opioids intended and Prophylactic antiemetics administered. Anesthetic plan and risks discussed with patient. Plan discussed with CRNA.     Attending anesthesiologist reviewed and agrees with Pre Eval content    History, data, and pertinent studies from chart review. Above represents information available via the shared medical records including previous anesthesia medication and allergy history.  Confirmation of above and final disposition per DOS anesthesiologist.         Christina Calloway MD   4/14/2021

## 2021-04-15 ENCOUNTER — ANESTHESIA (OUTPATIENT)
Dept: OPERATING ROOM | Age: 56
End: 2021-04-15
Payer: COMMERCIAL

## 2021-04-15 ENCOUNTER — HOSPITAL ENCOUNTER (OUTPATIENT)
Age: 56
Setting detail: OUTPATIENT SURGERY
Discharge: HOME | End: 2021-04-15
Attending: ORTHOPAEDIC SURGERY | Admitting: ORTHOPAEDIC SURGERY
Payer: COMMERCIAL

## 2021-04-15 VITALS
WEIGHT: 130 LBS | HEART RATE: 77 BPM | TEMPERATURE: 97 F | DIASTOLIC BLOOD PRESSURE: 61 MMHG | BODY MASS INDEX: 23.04 KG/M2 | RESPIRATION RATE: 18 BRPM | HEIGHT: 63 IN | OXYGEN SATURATION: 96 % | SYSTOLIC BLOOD PRESSURE: 109 MMHG

## 2021-04-15 VITALS
DIASTOLIC BLOOD PRESSURE: 97 MMHG | SYSTOLIC BLOOD PRESSURE: 158 MMHG | RESPIRATION RATE: 21 BRPM | OXYGEN SATURATION: 100 %

## 2021-04-15 DIAGNOSIS — M77.01 MEDIAL EPICONDYLITIS OF RIGHT ELBOW: ICD-10-CM

## 2021-04-15 DIAGNOSIS — M77.01 MEDIAL EPICONDYLITIS, RIGHT ELBOW: Primary | Chronic | ICD-10-CM

## 2021-04-15 PROCEDURE — 6370000000 HC RX 637 (ALT 250 FOR IP): Performed by: ANESTHESIOLOGY

## 2021-04-15 PROCEDURE — 3600000002 HC SURGERY LEVEL 2 BASE: Performed by: ORTHOPAEDIC SURGERY

## 2021-04-15 PROCEDURE — 2580000003 HC RX 258: Performed by: ANESTHESIOLOGY

## 2021-04-15 PROCEDURE — 3600000012 HC SURGERY LEVEL 2 ADDTL 15MIN: Performed by: ORTHOPAEDIC SURGERY

## 2021-04-15 PROCEDURE — 7100000000 HC PACU RECOVERY - FIRST 15 MIN: Performed by: ORTHOPAEDIC SURGERY

## 2021-04-15 PROCEDURE — 2709999900 HC NON-CHARGEABLE SUPPLY: Performed by: ORTHOPAEDIC SURGERY

## 2021-04-15 PROCEDURE — 7100000001 HC PACU RECOVERY - ADDTL 15 MIN: Performed by: ORTHOPAEDIC SURGERY

## 2021-04-15 PROCEDURE — 6360000002 HC RX W HCPCS: Performed by: NURSE ANESTHETIST, CERTIFIED REGISTERED

## 2021-04-15 PROCEDURE — 24359 REPAIR ELBOW DEB/ATTCH OPEN: CPT | Performed by: ORTHOPAEDIC SURGERY

## 2021-04-15 PROCEDURE — 2500000003 HC RX 250 WO HCPCS: Performed by: NURSE ANESTHETIST, CERTIFIED REGISTERED

## 2021-04-15 PROCEDURE — 3700000000 HC ANESTHESIA ATTENDED CARE: Performed by: ORTHOPAEDIC SURGERY

## 2021-04-15 PROCEDURE — 3700000001 HC ADD 15 MINUTES (ANESTHESIA): Performed by: ORTHOPAEDIC SURGERY

## 2021-04-15 PROCEDURE — 7100000011 HC PHASE II RECOVERY - ADDTL 15 MIN: Performed by: ORTHOPAEDIC SURGERY

## 2021-04-15 PROCEDURE — 7100000010 HC PHASE II RECOVERY - FIRST 15 MIN: Performed by: ORTHOPAEDIC SURGERY

## 2021-04-15 RX ORDER — MEPERIDINE HYDROCHLORIDE 25 MG/ML
12.5 INJECTION INTRAMUSCULAR; INTRAVENOUS; SUBCUTANEOUS EVERY 5 MIN PRN
Status: DISCONTINUED | OUTPATIENT
Start: 2021-04-15 | End: 2021-04-15 | Stop reason: HOSPADM

## 2021-04-15 RX ORDER — HYDRALAZINE HYDROCHLORIDE 20 MG/ML
5 INJECTION INTRAMUSCULAR; INTRAVENOUS EVERY 10 MIN PRN
Status: DISCONTINUED | OUTPATIENT
Start: 2021-04-15 | End: 2021-04-15 | Stop reason: HOSPADM

## 2021-04-15 RX ORDER — GLYCOPYRROLATE 1 MG/5 ML
SYRINGE (ML) INTRAVENOUS PRN
Status: DISCONTINUED | OUTPATIENT
Start: 2021-04-15 | End: 2021-04-15 | Stop reason: SDUPTHER

## 2021-04-15 RX ORDER — PROMETHAZINE HYDROCHLORIDE 25 MG/ML
25 INJECTION, SOLUTION INTRAMUSCULAR; INTRAVENOUS
Status: DISCONTINUED | OUTPATIENT
Start: 2021-04-15 | End: 2021-04-15 | Stop reason: HOSPADM

## 2021-04-15 RX ORDER — HYDROCODONE BITARTRATE AND ACETAMINOPHEN 5; 325 MG/1; MG/1
1 TABLET ORAL PRN
Status: COMPLETED | OUTPATIENT
Start: 2021-04-15 | End: 2021-04-15

## 2021-04-15 RX ORDER — MORPHINE SULFATE 2 MG/ML
1 INJECTION, SOLUTION INTRAMUSCULAR; INTRAVENOUS EVERY 5 MIN PRN
Status: DISCONTINUED | OUTPATIENT
Start: 2021-04-15 | End: 2021-04-15 | Stop reason: HOSPADM

## 2021-04-15 RX ORDER — METOCLOPRAMIDE 10 MG/1
10 TABLET ORAL ONCE
Status: COMPLETED | OUTPATIENT
Start: 2021-04-15 | End: 2021-04-15

## 2021-04-15 RX ORDER — ONDANSETRON 2 MG/ML
INJECTION INTRAMUSCULAR; INTRAVENOUS PRN
Status: DISCONTINUED | OUTPATIENT
Start: 2021-04-15 | End: 2021-04-15 | Stop reason: SDUPTHER

## 2021-04-15 RX ORDER — HYDROCODONE BITARTRATE AND ACETAMINOPHEN 5; 325 MG/1; MG/1
2 TABLET ORAL PRN
Status: COMPLETED | OUTPATIENT
Start: 2021-04-15 | End: 2021-04-15

## 2021-04-15 RX ORDER — FAMOTIDINE 20 MG/1
20 TABLET, FILM COATED ORAL ONCE
Status: COMPLETED | OUTPATIENT
Start: 2021-04-15 | End: 2021-04-15

## 2021-04-15 RX ORDER — KETOROLAC TROMETHAMINE 30 MG/ML
INJECTION, SOLUTION INTRAMUSCULAR; INTRAVENOUS PRN
Status: DISCONTINUED | OUTPATIENT
Start: 2021-04-15 | End: 2021-04-15 | Stop reason: SDUPTHER

## 2021-04-15 RX ORDER — HYDROCODONE BITARTRATE AND ACETAMINOPHEN 5; 325 MG/1; MG/1
1 TABLET ORAL EVERY 4 HOURS PRN
Qty: 40 TABLET | Refills: 0 | Status: SHIPPED | OUTPATIENT
Start: 2021-04-15 | End: 2021-04-22

## 2021-04-15 RX ORDER — OXYCODONE HYDROCHLORIDE AND ACETAMINOPHEN 5; 325 MG/1; MG/1
1 TABLET ORAL EVERY 4 HOURS PRN
Status: DISCONTINUED | OUTPATIENT
Start: 2021-04-15 | End: 2021-04-15 | Stop reason: HOSPADM

## 2021-04-15 RX ORDER — LABETALOL HYDROCHLORIDE 5 MG/ML
5 INJECTION, SOLUTION INTRAVENOUS EVERY 10 MIN PRN
Status: DISCONTINUED | OUTPATIENT
Start: 2021-04-15 | End: 2021-04-15 | Stop reason: HOSPADM

## 2021-04-15 RX ORDER — LIDOCAINE HYDROCHLORIDE 20 MG/ML
INJECTION, SOLUTION EPIDURAL; INFILTRATION; INTRACAUDAL; PERINEURAL PRN
Status: DISCONTINUED | OUTPATIENT
Start: 2021-04-15 | End: 2021-04-15 | Stop reason: SDUPTHER

## 2021-04-15 RX ORDER — FENTANYL CITRATE 50 UG/ML
INJECTION, SOLUTION INTRAMUSCULAR; INTRAVENOUS PRN
Status: DISCONTINUED | OUTPATIENT
Start: 2021-04-15 | End: 2021-04-15 | Stop reason: SDUPTHER

## 2021-04-15 RX ORDER — FENTANYL CITRATE 50 UG/ML
50 INJECTION, SOLUTION INTRAMUSCULAR; INTRAVENOUS EVERY 5 MIN PRN
Status: DISCONTINUED | OUTPATIENT
Start: 2021-04-15 | End: 2021-04-15 | Stop reason: HOSPADM

## 2021-04-15 RX ORDER — DIPHENHYDRAMINE HYDROCHLORIDE 50 MG/ML
12.5 INJECTION INTRAMUSCULAR; INTRAVENOUS
Status: DISCONTINUED | OUTPATIENT
Start: 2021-04-15 | End: 2021-04-15 | Stop reason: HOSPADM

## 2021-04-15 RX ORDER — MIDAZOLAM HYDROCHLORIDE 1 MG/ML
INJECTION INTRAMUSCULAR; INTRAVENOUS PRN
Status: DISCONTINUED | OUTPATIENT
Start: 2021-04-15 | End: 2021-04-15 | Stop reason: SDUPTHER

## 2021-04-15 RX ORDER — PROPOFOL 10 MG/ML
INJECTION, EMULSION INTRAVENOUS PRN
Status: DISCONTINUED | OUTPATIENT
Start: 2021-04-15 | End: 2021-04-15 | Stop reason: SDUPTHER

## 2021-04-15 RX ORDER — DEXAMETHASONE SODIUM PHOSPHATE 10 MG/ML
INJECTION, SOLUTION INTRAMUSCULAR; INTRAVENOUS PRN
Status: DISCONTINUED | OUTPATIENT
Start: 2021-04-15 | End: 2021-04-15 | Stop reason: SDUPTHER

## 2021-04-15 RX ORDER — SODIUM CHLORIDE, SODIUM LACTATE, POTASSIUM CHLORIDE, CALCIUM CHLORIDE 600; 310; 30; 20 MG/100ML; MG/100ML; MG/100ML; MG/100ML
INJECTION, SOLUTION INTRAVENOUS CONTINUOUS
Status: DISCONTINUED | OUTPATIENT
Start: 2021-04-15 | End: 2021-04-15 | Stop reason: HOSPADM

## 2021-04-15 RX ADMIN — ONDANSETRON 4 MG: 2 INJECTION INTRAMUSCULAR; INTRAVENOUS at 08:00

## 2021-04-15 RX ADMIN — METOCLOPRAMIDE 10 MG: 10 TABLET ORAL at 06:39

## 2021-04-15 RX ADMIN — SODIUM CHLORIDE, POTASSIUM CHLORIDE, SODIUM LACTATE AND CALCIUM CHLORIDE: 600; 310; 30; 20 INJECTION, SOLUTION INTRAVENOUS at 06:39

## 2021-04-15 RX ADMIN — LIDOCAINE HYDROCHLORIDE 50 MG: 20 INJECTION, SOLUTION EPIDURAL; INFILTRATION; INTRACAUDAL; PERINEURAL at 07:28

## 2021-04-15 RX ADMIN — FENTANYL CITRATE 50 MCG: 50 INJECTION, SOLUTION INTRAMUSCULAR; INTRAVENOUS at 07:28

## 2021-04-15 RX ADMIN — HYDROCODONE BITARTRATE AND ACETAMINOPHEN 1 TABLET: 5; 325 TABLET ORAL at 09:15

## 2021-04-15 RX ADMIN — PROPOFOL 150 MG: 10 INJECTION, EMULSION INTRAVENOUS at 07:28

## 2021-04-15 RX ADMIN — Medication 0.2 MG: at 07:25

## 2021-04-15 RX ADMIN — FENTANYL CITRATE 50 MCG: 50 INJECTION, SOLUTION INTRAMUSCULAR; INTRAVENOUS at 07:24

## 2021-04-15 RX ADMIN — FAMOTIDINE 20 MG: 20 TABLET ORAL at 06:39

## 2021-04-15 RX ADMIN — FENTANYL CITRATE 50 MCG: 50 INJECTION, SOLUTION INTRAMUSCULAR; INTRAVENOUS at 07:40

## 2021-04-15 RX ADMIN — DEXAMETHASONE SODIUM PHOSPHATE 10 MG: 10 INJECTION, SOLUTION INTRAMUSCULAR; INTRAVENOUS at 07:28

## 2021-04-15 RX ADMIN — MIDAZOLAM 2 MG: 1 INJECTION INTRAMUSCULAR; INTRAVENOUS at 07:23

## 2021-04-15 RX ADMIN — KETOROLAC TROMETHAMINE 30 MG: 30 INJECTION, SOLUTION INTRAMUSCULAR; INTRAVENOUS at 08:06

## 2021-04-15 ASSESSMENT — PULMONARY FUNCTION TESTS
PIF_VALUE: 15
PIF_VALUE: 13
PIF_VALUE: 10
PIF_VALUE: 2
PIF_VALUE: 15
PIF_VALUE: 2
PIF_VALUE: 15
PIF_VALUE: 13
PIF_VALUE: 4
PIF_VALUE: 15
PIF_VALUE: 13
PIF_VALUE: 15
PIF_VALUE: 15
PIF_VALUE: 2
PIF_VALUE: 1
PIF_VALUE: 15
PIF_VALUE: 15
PIF_VALUE: 13
PIF_VALUE: 15
PIF_VALUE: 9
PIF_VALUE: 13
PIF_VALUE: 15
PIF_VALUE: 13
PIF_VALUE: 15
PIF_VALUE: 13
PIF_VALUE: 13
PIF_VALUE: 20
PIF_VALUE: 4
PIF_VALUE: 15
PIF_VALUE: 13
PIF_VALUE: 0
PIF_VALUE: 15

## 2021-04-15 ASSESSMENT — PAIN SCALES - GENERAL
PAINLEVEL_OUTOF10: 0
PAINLEVEL_OUTOF10: 5

## 2021-04-15 ASSESSMENT — LIFESTYLE VARIABLES: SMOKING_STATUS: 0

## 2021-04-15 NOTE — H&P
negative for hematuria  ENDOCRINE:  negative for tremor  MUSCULOSKELETAL:  positive for  myalgias and muscle weakness  NEUROLOGICAL:  negative for seizures and syncope  BEHAVIOR/PSYCH:  negative for agitated and anxiety    PHYSICAL EXAM:  /75   Pulse 87   Temp 97.9 °F (36.6 °C) (Skin)   Resp 18   Ht 5' 3\" (1.6 m)   Wt 130 lb (59 kg)   SpO2 98%   BMI 23.03 kg/m²   General appearance:  awake, alert, cooperative, no apparent distress, and appears stated age  Neurologic: No abnormalities  Lungs:  No increased work of breathing, good air exchange, clear to auscultation bilaterally, no crackles or wheezing  Heart:  Normal apical impulse, regular rate and rhythm, normal S1 and S2, no S3 or S4, and no murmur noted  Abdomen:  normal bowel sounds  Skin: warm and dry, no rash or erythema  ENT: tympanic membrane, external ear and ear canal normal bilaterally, oropharynx clear and moist with normal mucous membranes  Musculoskeletal: Painful range of motion right elbow with medial pain predominating    General Labs:  CBC:   Lab Results   Component Value Date    WBC 9.3 11/05/2016    RBC 5.09 11/05/2016    HGB 13.6 11/05/2016    HCT 41.2 11/05/2016    MCV 80.9 11/05/2016    RDW 12.8 11/05/2016     11/05/2016     CMP:    Lab Results   Component Value Date     11/05/2016    K 3.4 11/05/2016     11/05/2016    CO2 29 11/05/2016    BUN 12 11/05/2016    PROT 7.3 01/26/2015     U/A:  No components found for: Dutch Mclean, USPGRAV, UPH, UPROTEIN, UGLUCOSE, UKETONE, UBILI, UBLOOD, Pete, UUROBIL, [place], USQEPI, [place], [facility], [place], [place]    Radiology: None    ASSESSMENT AND PLAN:    Fragment medial epicondyle right elbow      Electronically signed by Shan Mata DO on 4/15/2021 at 7:09 AM

## 2021-04-15 NOTE — ANESTHESIA POSTPROCEDURE EVALUATION
Department of Anesthesiology  Postprocedure Note    Patient: Ford Medley  MRN: [id number]  YOB: 1965  Date of evaluation: 4/15/2021  Time:  9:04 AM     Procedure Summary     Date: 04/15/21 Room / Location: [address] [place] [address] 01 / [address] [facility]    Anesthesia Start: 8754 Anesthesia Stop: 1485    Procedure: DEBRIDEMENT RIGHT ELBOW (CPT 65095) (Left ) Diagnosis: (MEDIAL EPICONDYLITIS, RIGHT ELBOW)    Surgeons: Edna Rios DO Responsible Provider: Bianca Parekh MD    Anesthesia Type: general ASA Status: 3          Anesthesia Type: general    Vivienne Phase I: Vivienne Score: 8    Vivienne Phase II:      Last vitals: Reviewed and per EMR flowsheets.        Anesthesia Post Evaluation    Patient location during evaluation: PACU  Patient participation: complete - patient participated  Level of consciousness: awake  Airway patency: patent  Nausea & Vomiting: no nausea and no vomiting  Complications: no  Cardiovascular status: hemodynamically stable  Respiratory status: room air and spontaneous ventilation  Hydration status: stable

## 2021-04-15 NOTE — OP NOTE
Operative report        DATE OF PROCEDURE: 4/15/2021     SURGEON: Marv Salinas    ASSISTANT: None    PREOPERATIVE DIAGNOSIS: Medial epicondylitis right elbow    POSTOPERATIVE DIAGNOSIS: Same    OPERATION: Debridement flexor tendon right medial elbow with partial medial epicondylectomy    ANESTHESIA: General    ESTIMATED BLOOD LOSS: Scant    COMPLICATIONS: None    SPECIMENS: Was sent to pathology    HISTORY: The patient is a 61y.o. year old female with history of above preop diagnosis. I explained the risk, benefits, expected outcome, and alternatives to the procedure. Patient understands and is in agreement. PROCEDURE: The patient is brought the operating room after signs are identified. Adequate general anesthetic was administered by anesthesia with the patient supine on the operating table. Tourniquet is placed high in the right upper arm then the arm was prepped and draped sterile fashion. An incision was marked longitudinally over the medial epicondyle. The arm was exsanguinated an elastic wrap and tourniquet pressure was set at 250 mmHg. Incision was made and blunt dissection was carried out beneath the skin. 2 and half power loupe modification electrocautery were used throughout the procedure. The medial epicondyle area was cleared of soft tissues. The ulnar nerve was carefully protected and kept out of the way. Small subcutaneous nerves and vessels were retracted bluntly as well. The tendon of the common extensors was identified over the medial epicondyle. This is split longitudinally and peeled from the superficial part of the epicondyles. The prominence of the epicondyle was then debrided and smoothed down lowering the contour. The muscle was not completely released. The ligaments were all preserved. Some redundant tendon tissue was removed and the tendon was then brought back together covering the epicondyle.   2-0 Vicryl sutures were used in a buried fashion to repair the tendon and cover the epicondyles once again. The area was thoroughly irrigated prior to this and after an intradermal 4-0 Vicryl sutures were placed. Skin was further supported with Steri-Strips. Xeroform gauze bulky dressing was applied and the tourniquet was deflated with good circulation turning the upper extremity. Patient's anesthetic was reversed she is taken recovery in stable condition. GROSS PATHOLOGY: Prominent medial epicondyle right elbow with pain and tendinitis chronic in nature. All reasonable efforts have been made to minimize the risk of errors that may occur in the use of voice recognition and other electronic means of charting.       Electronically signed by Magalis Galdamez DO on 4/15/21 at 8:45 AM EDT

## 2021-04-26 ENCOUNTER — OFFICE VISIT (OUTPATIENT)
Dept: ORTHOPEDIC SURGERY | Age: 56
End: 2021-04-26

## 2021-04-26 VITALS — HEIGHT: 63 IN | WEIGHT: 135 LBS | BODY MASS INDEX: 23.92 KG/M2

## 2021-04-26 DIAGNOSIS — M77.01 MEDIAL EPICONDYLITIS, RIGHT ELBOW: Primary | ICD-10-CM

## 2021-04-26 PROCEDURE — 99024 POSTOP FOLLOW-UP VISIT: CPT | Performed by: ORTHOPAEDIC SURGERY

## 2021-04-26 NOTE — PROGRESS NOTES
Chief Complaint:   Chief Complaint   Patient presents with    Post-Op Check     debridement right elbow       Tuyet Colunga 11 days postop medial epicondyle surgery right elbow. She is doing well. She is being careful about avoiding much activity. She is doing some light hands. She has been wearing her sling and is doing well. Allergies; medications; past medical, surgical, family, and social history; and problem list have been reviewed today and updated as indicated in this encounter seen below. Exam: The wound looks good. Steri-Strips are in place and the edges are well approximated. Range of motion is good. She is careful with. There is no signs of complications. Radiographs: None    ASSESSMENT:    Caty Dwyer was seen today for post-op check. Diagnoses and all orders for this visit:    Medial epicondylitis, right elbow        PLAN: Continue with dressing for padding and the Ace bandage overlying to protect as well. She will continue to use her sling most of the time. We will follow-up in 3 weeks. She may shower the wound. Return in about 3 weeks (around 5/17/2021). Current Outpatient Medications   Medication Sig Dispense Refill    DEXILANT 60 MG CPDR delayed release capsule Take 60 mg by mouth daily       meclizine (ANTIVERT) 25 MG tablet TAKE 1 TABLET BY MOUTH 3 TIMES A DAY AS NEEDED DIZZINESS      budesonide-formoterol (SYMBICORT) 80-4.5 MCG/ACT AERO USE 2 INHALATIONS ORALLY   TWICE DAILY 3 Inhaler 3    montelukast (SINGULAIR) 10 MG tablet Take 1 tablet by mouth nightly 30 tablet 5    atorvastatin (LIPITOR) 40 MG TABS Take 40 mg by mouth daily       Cholecalciferol (VITAMIN D) 2000 UNITS CAPS capsule Take  by mouth daily.  venlafaxine (EFFEXOR) 75 MG tablet Take 75 mg by mouth daily        No current facility-administered medications for this visit.         Patient Active Problem List   Diagnosis    Mild persistent asthma without complication    Perennial allergic

## 2021-04-27 ENCOUNTER — IMMUNIZATION (OUTPATIENT)
Dept: PRIMARY CARE CLINIC | Age: 56
End: 2021-04-27
Payer: COMMERCIAL

## 2021-04-27 PROCEDURE — 91301 COVID-19, MODERNA VACCINE 100MCG/0.5ML DOSE: CPT | Performed by: NURSE PRACTITIONER

## 2021-04-27 PROCEDURE — 0012A COVID-19, MODERNA VACCINE 100MCG/0.5ML DOSE: CPT | Performed by: NURSE PRACTITIONER

## 2021-05-17 ENCOUNTER — OFFICE VISIT (OUTPATIENT)
Dept: ORTHOPEDIC SURGERY | Age: 56
End: 2021-05-17

## 2021-05-17 VITALS — HEIGHT: 63 IN | BODY MASS INDEX: 23.92 KG/M2 | TEMPERATURE: 98 F | WEIGHT: 135 LBS

## 2021-05-17 DIAGNOSIS — M77.01 MEDIAL EPICONDYLITIS, RIGHT ELBOW: Primary | ICD-10-CM

## 2021-05-17 PROCEDURE — 99024 POSTOP FOLLOW-UP VISIT: CPT | Performed by: ORTHOPAEDIC SURGERY

## 2021-05-17 NOTE — PROGRESS NOTES
Chief Complaint:   Chief Complaint   Patient presents with    Post-Op Check     right elbow debridement DOS 4/15/21. C/O being tender        Tuyet Colunga is about 4 and half weeks postop right medial epicondylectomy. She is doing well. She occasionally gets sore when she has been doing too much and she rests her arm at that point time. She still using the Ace bandage and using the sling at times including at night. She gets a little tangled up and she is probably can quit using the sling      Allergies; medications; past medical, surgical, family, and social history; and problem list have been reviewed today and updated as indicated in this encounter seen below. Exam: The wounds wet very well-healed. Incision is well approximated and a thin scar. Range of motion of the elbow is good. There is mild tenderness around the medial epicondyle area. Radiographs: None    ASSESSMENT:    Ghada Borges was seen today for post-op check. Diagnoses and all orders for this visit:    Medial epicondylitis, right elbow        PLAN: Gradually increase activity but avoid strenuous activities. She should continue to use the Ace bandage. The sling is optional at this point. We will follow-up in 3 weeks. Return in about 3 weeks (around 6/7/2021). Current Outpatient Medications   Medication Sig Dispense Refill    DEXILANT 60 MG CPDR delayed release capsule Take 60 mg by mouth daily       meclizine (ANTIVERT) 25 MG tablet TAKE 1 TABLET BY MOUTH 3 TIMES A DAY AS NEEDED DIZZINESS      budesonide-formoterol (SYMBICORT) 80-4.5 MCG/ACT AERO USE 2 INHALATIONS ORALLY   TWICE DAILY 3 Inhaler 3    montelukast (SINGULAIR) 10 MG tablet Take 1 tablet by mouth nightly 30 tablet 5    atorvastatin (LIPITOR) 40 MG TABS Take 40 mg by mouth daily       Cholecalciferol (VITAMIN D) 2000 UNITS CAPS capsule Take  by mouth daily.       venlafaxine (EFFEXOR) 75 MG tablet Take 75 mg by mouth daily        No current facility-administered medications for this visit. Patient Active Problem List   Diagnosis    Mild persistent asthma without complication    Perennial allergic rhinitis with seasonal variation    Thoracic scoliosis    GERD with stricture    Depression    Restrictive lung disease    Thoracogenic scoliosis of thoracic region    Medial epicondylitis, right elbow       Past Medical History:   Diagnosis Date    Asthma     Depression     GERD (gastroesophageal reflux disease)     Hyperlipidemia     Scoliosis     Obrien-Parkinson-White syndrome     onset age 28's  no problems in last 10 yrs       Past Surgical History:   Procedure Laterality Date     SECTION      CYST REMOVAL      cyst removal from head    ELBOW DEBRIDEMENT Right 04/15/2021    HYSTERECTOMY      SHOULDER SURGERY      manipulation    WRIST SURGERY Left 4/15/2021    DEBRIDEMENT RIGHT ELBOW (CPT 05564) performed by Yobani Arias DO at [facility]   Allergen Reactions    Dog Epithelium Allergy Skin Test Itching     All animals       Social History     Socioeconomic History    Marital status:      Spouse name: None    Number of children: None    Years of education: None    Highest education level: None   Occupational History    None   Tobacco Use    Smoking status: Never Smoker    Smokeless tobacco: Never Used   Vaping Use    Vaping Use: Never used   Substance and Sexual Activity    Alcohol use: Yes     Comment: rare    Drug use: No    Sexual activity: Yes     Partners: Male   Other Topics Concern    None   Social History Narrative    None     Social Determinants of Health     Financial Resource Strain:     Difficulty of Paying Living Expenses:    Food Insecurity:     Worried About Running Out of Food in the Last Year:     Ran Out of Food in the Last Year:    Transportation Needs:     Lack of Transportation (Medical):      Lack of Transportation (Non-Medical):    Physical Activity:     Days of Exercise per Week:     Minutes of Exercise per Session:    Stress:     Feeling of Stress :    Social Connections:     Frequency of Communication with Friends and Family:     Frequency of Social Gatherings with Friends and Family:     Attends Jewish Services:     Active Member of Clubs or Organizations:     Attends Club or Organization Meetings:     Marital Status:    Intimate Partner Violence:     Fear of Current or Ex-Partner:     Emotionally Abused:     Physically Abused:     Sexually Abused:        Review of Systems  As follows except as previously noted in HPI:  Constitutional: Negative for chills, diaphoresis, fatigue, fever and unexpected weight change. Respiratory: Negative for cough, shortness of breath and wheezing. Cardiovascular: Negative for chest pain and palpitations. Neurological: Negative for dizziness, syncope, cephalgia. GI / : negative  Musculoskeletal: see HPI       Objective:   Physical Exam   Constitutional: Oriented to person, place, and time. and appears well-developed and well-nourished. :   Head: Normocephalic and atraumatic. Eyes: EOM are normal.   Neck: Neck supple. Cardiovascular: Normal rate and regular rhythm. Pulmonary/Chest: Effort normal. No stridor. No respiratory distress, no wheezes. Abdominal:  No abnormal distension. Neurological: Alert and oriented to person, place, and time. Skin: Skin is warm and dry. Psychiatric: Normal mood and affect.  Behavior is normal. Thought content normal.    K Elouise Spurling, DO    5/17/21  8:53 AM

## 2021-06-07 ENCOUNTER — OFFICE VISIT (OUTPATIENT)
Dept: ORTHOPEDIC SURGERY | Age: 56
End: 2021-06-07

## 2021-06-07 VITALS — WEIGHT: 135 LBS | HEIGHT: 63 IN | BODY MASS INDEX: 23.92 KG/M2 | TEMPERATURE: 98 F

## 2021-06-07 DIAGNOSIS — M77.01 MEDIAL EPICONDYLITIS, RIGHT ELBOW: Primary | ICD-10-CM

## 2021-06-07 PROCEDURE — 99024 POSTOP FOLLOW-UP VISIT: CPT | Performed by: ORTHOPAEDIC SURGERY

## 2021-06-07 NOTE — PROGRESS NOTES
Chief Complaint:   Chief Complaint   Patient presents with    Elbow Pain     f/u right elbow debridement DOS: 4/15/21. Patient c/o more soreness at this visit. hTanh Said is about 7 weeks postop right medial epicondylar area right elbow. She says little bit of soreness down into her forearm and wrist area. She is little stiffness and some tenderness as well. She is been wrapping the elbow most of the time. She asked if as necessary or beneficial.      Allergies; medications; past medical, surgical, family, and social history; and problem list have been reviewed today and updated as indicated in this encounter seen below. Exam: The wounds very nicely healed. There is tenderness around the medial elbow condyle area. There is some discomfort with manual muscle testing but is very mild. Elbow range of motion is limited and little bit guarded as well. She is lost about 10 degrees of extension and 10 to 15 degrees of flexion though her range of motion is still beyond functional range. Radiographs: None    ASSESSMENT:    Teodora Cuello was seen today for elbow pain. Diagnoses and all orders for this visit:    Medial epicondylitis, right elbow        PLAN: We discussed physical therapy and we will get her scheduled for that and follow-up in 3 weeks. Wrapping is ad sherri. as is any topical treatment. Return in about 3 weeks (around 6/28/2021).        Current Outpatient Medications   Medication Sig Dispense Refill    montelukast (SINGULAIR) 10 MG tablet Take 1 tablet by mouth nightly 30 tablet 5    DEXILANT 60 MG CPDR delayed release capsule Take 60 mg by mouth daily       meclizine (ANTIVERT) 25 MG tablet TAKE 1 TABLET BY MOUTH 3 TIMES A DAY AS NEEDED DIZZINESS      budesonide-formoterol (SYMBICORT) 80-4.5 MCG/ACT AERO USE 2 INHALATIONS ORALLY   TWICE DAILY 3 Inhaler 3    atorvastatin (LIPITOR) 40 MG TABS Take 40 mg by mouth daily       Cholecalciferol (VITAMIN D) 2000 UNITS CAPS capsule Take  by mouth daily.  venlafaxine (EFFEXOR) 75 MG tablet Take 75 mg by mouth daily        No current facility-administered medications for this visit.        Patient Active Problem List   Diagnosis    Mild persistent asthma without complication    Perennial allergic rhinitis with seasonal variation    Thoracic scoliosis    GERD with stricture    Depression    Restrictive lung disease    Thoracogenic scoliosis of thoracic region    Medial epicondylitis, right elbow       Past Medical History:   Diagnosis Date    Asthma     Depression     GERD (gastroesophageal reflux disease)     Hyperlipidemia     Scoliosis     Obrien-Parkinson-White syndrome     onset age 28's  no problems in last 10 yrs       Past Surgical History:   Procedure Laterality Date     SECTION      CYST REMOVAL      cyst removal from head    ELBOW DEBRIDEMENT Right 04/15/2021    HYSTERECTOMY      SHOULDER SURGERY      manipulation    WRIST SURGERY Left 4/15/2021    DEBRIDEMENT RIGHT ELBOW (CPT 81475) performed by Alyssa Vincent DO at [address]   Allergen Reactions    Dog Epithelium Allergy Skin Test Itching     All animals       Social History     Socioeconomic History    Marital status:      Spouse name: None    Number of children: None    Years of education: None    Highest education level: None   Occupational History    None   Tobacco Use    Smoking status: Never Smoker    Smokeless tobacco: Never Used   Vaping Use    Vaping Use: Never used   Substance and Sexual Activity    Alcohol use: Yes     Comment: rare    Drug use: No    Sexual activity: Yes     Partners: Male   Other Topics Concern    None   Social History Narrative    None     Social Determinants of Health     Financial Resource Strain:     Difficulty of Paying Living Expenses:    Food Insecurity:     Worried About Running Out of Food in the Last Year:     Ran Out of Food in the Last Year:    Transportation Needs:  Lack of Transportation (Medical):  Lack of Transportation (Non-Medical):    Physical Activity:     Days of Exercise per Week:     Minutes of Exercise per Session:    Stress:     Feeling of Stress :    Social Connections:     Frequency of Communication with Friends and Family:     Frequency of Social Gatherings with Friends and Family:     Attends Protestant Services:     Active Member of Clubs or Organizations:     Attends Club or Organization Meetings:     Marital Status:    Intimate Partner Violence:     Fear of Current or Ex-Partner:     Emotionally Abused:     Physically Abused:     Sexually Abused:        Review of Systems  As follows except as previously noted in HPI:  Constitutional: Negative for chills, diaphoresis, fatigue, fever and unexpected weight change. Respiratory: Negative for cough, shortness of breath and wheezing. Cardiovascular: Negative for chest pain and palpitations. Neurological: Negative for dizziness, syncope, cephalgia. GI / : negative  Musculoskeletal: see HPI       Objective:   Physical Exam   Constitutional: Oriented to person, place, and time. and appears well-developed and well-nourished. :   Head: Normocephalic and atraumatic. Eyes: EOM are normal.   Neck: Neck supple. Cardiovascular: Normal rate and regular rhythm. Pulmonary/Chest: Effort normal. No stridor. No respiratory distress, no wheezes. Abdominal:  No abnormal distension. Neurological: Alert and oriented to person, place, and time. Skin: Skin is warm and dry. Psychiatric: Normal mood and affect.  Behavior is normal. Thought content normal.    TERESA Brown DO    6/7/21  9:03 AM

## 2021-06-28 ENCOUNTER — OFFICE VISIT (OUTPATIENT)
Dept: ORTHOPEDIC SURGERY | Age: 56
End: 2021-06-28

## 2021-06-28 VITALS — WEIGHT: 135 LBS | TEMPERATURE: 98 F | BODY MASS INDEX: 23.92 KG/M2 | HEIGHT: 63 IN

## 2021-06-28 DIAGNOSIS — M77.01 MEDIAL EPICONDYLITIS, RIGHT ELBOW: Primary | ICD-10-CM

## 2021-06-28 PROCEDURE — 99024 POSTOP FOLLOW-UP VISIT: CPT | Performed by: ORTHOPAEDIC SURGERY

## 2021-06-28 NOTE — PROGRESS NOTES
Chief Complaint:   Chief Complaint   Patient presents with    Elbow Pain     RIght elbow debridement DOS 4/15/21. Elbow feeling good. Not giving her any problems at this point in time. Rohini Valles is about 10 weeks postop right mid medial epicondyles right elbow. She is doing a lot better with that. She is nearly done with her physical therapy. She has good range of motion and minimal discomfort. She has had an additional problem of her right shoulder with no history of incident or accident. She is had therapy incorporating treatment of that as well. The therapist is working with her on an individual basis. Allergies; medications; past medical, surgical, family, and social history; and problem list have been reviewed today and updated as indicated in this encounter seen below. Exam: The incision is well-healed along the medial right elbow. Range of motion is full. The elbow is stable with minimal discomfort to palpation over the area or with physical activity and resisted muscle testing. Radiographs: None    ASSESSMENT:    Mansi Domingo was seen today for elbow pain. Diagnoses and all orders for this visit:    Medial epicondylitis, right elbow        PLAN: Continue with home exercises for the elbow. Will follow up on an as-needed basis. If the shoulder problem continues she will come in for evaluation of that. Return if symptoms worsen or fail to improve.        Current Outpatient Medications   Medication Sig Dispense Refill    montelukast (SINGULAIR) 10 MG tablet Take 1 tablet by mouth nightly 30 tablet 5    DEXILANT 60 MG CPDR delayed release capsule Take 60 mg by mouth daily       meclizine (ANTIVERT) 25 MG tablet TAKE 1 TABLET BY MOUTH 3 TIMES A DAY AS NEEDED DIZZINESS      budesonide-formoterol (SYMBICORT) 80-4.5 MCG/ACT AERO USE 2 INHALATIONS ORALLY   TWICE DAILY 3 Inhaler 3    atorvastatin (LIPITOR) 40 MG TABS Take 40 mg by mouth daily       Cholecalciferol (VITAMIN D) 2000 UNITS CAPS capsule Take  by mouth daily.  venlafaxine (EFFEXOR) 75 MG tablet Take 75 mg by mouth daily        No current facility-administered medications for this visit.        Patient Active Problem List   Diagnosis    Mild persistent asthma without complication    Perennial allergic rhinitis with seasonal variation    Thoracic scoliosis    GERD with stricture    Depression    Restrictive lung disease    Thoracogenic scoliosis of thoracic region    Medial epicondylitis, right elbow       Past Medical History:   Diagnosis Date    Asthma     Depression     GERD (gastroesophageal reflux disease)     Hyperlipidemia     Scoliosis     Obrien-Parkinson-White syndrome     onset age 28's  no problems in last 10 yrs       Past Surgical History:   Procedure Laterality Date     SECTION      CYST REMOVAL      cyst removal from head    ELBOW DEBRIDEMENT Right 04/15/2021    HYSTERECTOMY      SHOULDER SURGERY      manipulation    WRIST SURGERY Left 4/15/2021    DEBRIDEMENT RIGHT ELBOW (CPT 96423) performed by Ana Espinosa DO at [address]   Allergen Reactions    Dog Epithelium Allergy Skin Test Itching     All animals       Social History     Socioeconomic History    Marital status:      Spouse name: None    Number of children: None    Years of education: None    Highest education level: None   Occupational History    None   Tobacco Use    Smoking status: Never Smoker    Smokeless tobacco: Never Used   Vaping Use    Vaping Use: Never used   Substance and Sexual Activity    Alcohol use: Yes     Comment: rare    Drug use: No    Sexual activity: Yes     Partners: Male   Other Topics Concern    None   Social History Narrative    None     Social Determinants of Health     Financial Resource Strain:     Difficulty of Paying Living Expenses:    Food Insecurity:     Worried About Running Out of Food in the Last Year:     [address] in the Last Year: Transportation Needs:     Lack of Transportation (Medical):  Lack of Transportation (Non-Medical):    Physical Activity:     Days of Exercise per Week:     Minutes of Exercise per Session:    Stress:     Feeling of Stress :    Social Connections:     Frequency of Communication with Friends and Family:     Frequency of Social Gatherings with Friends and Family:     Attends Catholic Services:     Active Member of Clubs or Organizations:     Attends Club or Organization Meetings:     Marital Status:    Intimate Partner Violence:     Fear of Current or Ex-Partner:     Emotionally Abused:     Physically Abused:     Sexually Abused:        Review of Systems  As follows except as previously noted in HPI:  Constitutional: Negative for chills, diaphoresis, fatigue, fever and unexpected weight change. Respiratory: Negative for cough, shortness of breath and wheezing. Cardiovascular: Negative for chest pain and palpitations. Neurological: Negative for dizziness, syncope, cephalgia. GI / : negative  Musculoskeletal: see HPI       Objective:   Physical Exam   Constitutional: Oriented to person, place, and time. and appears well-developed and well-nourished. :   Head: Normocephalic and atraumatic. Eyes: EOM are normal.   Neck: Neck supple. Cardiovascular: Normal rate and regular rhythm. Pulmonary/Chest: Effort normal. No stridor. No respiratory distress, no wheezes. Abdominal:  No abnormal distension. Neurological: Alert and oriented to person, place, and time. Skin: Skin is warm and dry. Psychiatric: Normal mood and affect.  Behavior is normal. Thought content normal.    TERESA Joseph DO    6/28/21  8:43 AM

## 2021-07-21 VITALS — TEMPERATURE: 98.9 F

## 2021-07-21 RX ORDER — CHLORAL HYDRATE 500 MG
3000 CAPSULE ORAL 3 TIMES DAILY
COMMUNITY
End: 2022-08-30

## 2021-07-21 RX ORDER — CALCIUM CARBONATE 500(1250)
500 TABLET ORAL DAILY
COMMUNITY

## 2021-08-09 ENCOUNTER — OFFICE VISIT (OUTPATIENT)
Dept: ORTHOPEDIC SURGERY | Age: 56
End: 2021-08-09
Payer: COMMERCIAL

## 2021-08-09 VITALS — HEIGHT: 63 IN | WEIGHT: 135 LBS | BODY MASS INDEX: 23.92 KG/M2 | TEMPERATURE: 98 F

## 2021-08-09 DIAGNOSIS — G89.29 CHRONIC RIGHT SHOULDER PAIN: ICD-10-CM

## 2021-08-09 DIAGNOSIS — M25.511 CHRONIC RIGHT SHOULDER PAIN: ICD-10-CM

## 2021-08-09 DIAGNOSIS — M25.511 ACUTE PAIN OF RIGHT SHOULDER: Primary | ICD-10-CM

## 2021-08-09 DIAGNOSIS — M75.01 ADHESIVE CAPSULITIS OF RIGHT SHOULDER: ICD-10-CM

## 2021-08-09 PROCEDURE — 99213 OFFICE O/P EST LOW 20 MIN: CPT | Performed by: ORTHOPAEDIC SURGERY

## 2021-08-09 PROCEDURE — 20610 DRAIN/INJ JOINT/BURSA W/O US: CPT | Performed by: ORTHOPAEDIC SURGERY

## 2021-08-09 RX ORDER — TRIAMCINOLONE ACETONIDE 40 MG/ML
40 INJECTION, SUSPENSION INTRA-ARTICULAR; INTRAMUSCULAR ONCE
Status: COMPLETED | OUTPATIENT
Start: 2021-08-09 | End: 2021-08-09

## 2021-08-09 RX ADMIN — TRIAMCINOLONE ACETONIDE 40 MG: 40 INJECTION, SUSPENSION INTRA-ARTICULAR; INTRAMUSCULAR at 12:17

## 2021-08-09 NOTE — PROGRESS NOTES
Radha Bentley is a 61 y.o. female, who presents   Chief Complaint   Patient presents with    Shoulder Pain     right shoulder pain still sore had pt and did not help       HPI[de-identified] Right shoulder pain is been present for several months. There is no history of injury or incident. That was being treated by the physical therapist in concert with her elbow which we operated on for the medial epicondylitis. It apparently reached an impasse as far as results with the shoulder and there is questions about what was wrong and Luda decided come here to see if a problem or cause could be discovered. Allergies; medications; past medical, surgical, family, and social history; and problem list have been reviewed today and updated as indicated in this encounter - see below following Ortho specifics. Musculoskeletal: Skin condition gross neurovascular function is good in right upper extremity. Elbow range of motion is good with no pain. Wrist and hand motion are good as well. The right shoulder is guarded even in sitting position. She guards range of motion which is limited somewhat in upper ranges and is painful to her. There is little crepitus appreciated. There is some limitation of effort with manual muscle testing because of the pain. Rotator cuff elements seem to be grossly intact. Radiologic Studies: Imaging of the right shoulder showed no acromial projections. There is no significant undersurface prominence of the AC joint. The humeral head is well located in the glenoid. Subacromial space is good. ASSESSMENT:  Tiffanie Clayton was seen today for shoulder pain. Diagnoses and all orders for this visit:    Acute pain of right shoulder  -     XR SHOULDER RIGHT (MIN 2 VIEWS);  Future    Chronic right shoulder pain    Adhesive capsulitis of right shoulder     Treatment alternatives were reviewed including medical and physical therapies, injections, and surgical options, expected risks benefits and likely Omega-3 Fatty Acids (FISH OIL) 1000 MG CAPS Take 3,000 mg by mouth 3 times daily      montelukast (SINGULAIR) 10 MG tablet Take 1 tablet by mouth nightly 30 tablet 5    DEXILANT 60 MG CPDR delayed release capsule Take 60 mg by mouth daily       meclizine (ANTIVERT) 25 MG tablet TAKE 1 TABLET BY MOUTH 3 TIMES A DAY AS NEEDED DIZZINESS      budesonide-formoterol (SYMBICORT) 80-4.5 MCG/ACT AERO USE 2 INHALATIONS ORALLY   TWICE DAILY 3 Inhaler 3    atorvastatin (LIPITOR) 40 MG TABS Take 40 mg by mouth daily       Cholecalciferol (VITAMIN D) 2000 UNITS CAPS capsule Take  by mouth daily.  venlafaxine (EFFEXOR) 75 MG tablet Take 75 mg by mouth daily        No current facility-administered medications for this visit. Allergies   Allergen Reactions    Dog Epithelium Allergy Skin Test Itching     All animals       Social History     Socioeconomic History    Marital status:      Spouse name: None    Number of children: None    Years of education: None    Highest education level: None   Occupational History    None   Tobacco Use    Smoking status: Never Smoker    Smokeless tobacco: Never Used   Vaping Use    Vaping Use: Never used   Substance and Sexual Activity    Alcohol use: Yes     Comment: rare    Drug use: No    Sexual activity: Yes     Partners: Male   Other Topics Concern    None   Social History Narrative    None     Social Determinants of Health     Financial Resource Strain:     Difficulty of Paying Living Expenses:    Food Insecurity:     Worried About Running Out of Food in the Last Year:     Ran Out of Food in the Last Year:    Transportation Needs:     Lack of Transportation (Medical):      Lack of Transportation (Non-Medical):    Physical Activity:     Days of Exercise per Week:     Minutes of Exercise per Session:    Stress:     Feeling of Stress :    Social Connections:     Frequency of Communication with Friends and Family:     Frequency of Social Gatherings with

## 2021-09-21 ENCOUNTER — HOSPITAL ENCOUNTER (OUTPATIENT)
Dept: GENERAL RADIOLOGY | Age: 56
Discharge: HOME | End: 2021-09-23
Payer: COMMERCIAL

## 2021-09-21 VITALS — HEIGHT: 63 IN | BODY MASS INDEX: 23.92 KG/M2 | WEIGHT: 135 LBS

## 2021-09-21 DIAGNOSIS — Z12.31 ENCOUNTER FOR SCREENING MAMMOGRAM FOR MALIGNANT NEOPLASM OF BREAST: ICD-10-CM

## 2021-09-21 PROCEDURE — 77063 BREAST TOMOSYNTHESIS BI: CPT

## 2021-12-23 PROBLEM — J45.31 MILD PERSISTENT ASTHMA WITH EXACERBATION: Status: ACTIVE | Noted: 2021-12-23

## 2021-12-23 PROBLEM — Z23 NEED FOR PROPHYLACTIC VACCINATION AND INOCULATION AGAINST INFLUENZA: Status: ACTIVE | Noted: 2021-12-23

## 2022-01-03 RX ORDER — ATORVASTATIN CALCIUM 40 MG/1
TABLET, FILM COATED ORAL
Qty: 30 TABLET | Refills: 2 | Status: SHIPPED
Start: 2022-01-03 | End: 2022-01-26

## 2022-01-04 DIAGNOSIS — E55.9 VITAMIN D DEFICIENCY: ICD-10-CM

## 2022-01-04 DIAGNOSIS — E78.5 HYPERLIPIDEMIA, UNSPECIFIED HYPERLIPIDEMIA TYPE: Primary | ICD-10-CM

## 2022-01-04 PROBLEM — I45.6 WOLFF-PARKINSON-WHITE SYNDROME: Status: ACTIVE | Noted: 2022-01-04

## 2022-01-13 DIAGNOSIS — E55.9 VITAMIN D DEFICIENCY: ICD-10-CM

## 2022-01-13 DIAGNOSIS — E78.5 HYPERLIPIDEMIA, UNSPECIFIED HYPERLIPIDEMIA TYPE: ICD-10-CM

## 2022-01-13 LAB
ALBUMIN SERPL-MCNC: 4 G/DL (ref 3.5–5.2)
ALP BLD-CCNC: 151 U/L (ref 35–104)
ALT SERPL-CCNC: 21 U/L (ref 0–32)
ANION GAP SERPL CALCULATED.3IONS-SCNC: 13 MMOL/L (ref 7–16)
AST SERPL-CCNC: 27 U/L (ref 0–31)
BILIRUB SERPL-MCNC: 0.2 MG/DL (ref 0–1.2)
BUN BLDV-MCNC: 10 MG/DL (ref 6–20)
CALCIUM SERPL-MCNC: 10 MG/DL (ref 8.6–10.2)
CHLORIDE BLD-SCNC: 107 MMOL/L (ref 98–107)
CHOLESTEROL, TOTAL: 217 MG/DL (ref 0–199)
CO2: 25 MMOL/L (ref 22–29)
CREAT SERPL-MCNC: 1 MG/DL (ref 0.5–1)
GFR AFRICAN AMERICAN: >60
GFR NON-AFRICAN AMERICAN: 57 ML/MIN/1.73
GLUCOSE FASTING: 92 MG/DL (ref 74–99)
HCT VFR BLD CALC: 43.3 % (ref 34–48)
HDLC SERPL-MCNC: 59 MG/DL
HEMOGLOBIN: 13.1 G/DL (ref 11.5–15.5)
LDL CHOLESTEROL CALCULATED: 117 MG/DL (ref 0–99)
MCH RBC QN AUTO: 25.8 PG (ref 26–35)
MCHC RBC AUTO-ENTMCNC: 30.3 % (ref 32–34.5)
MCV RBC AUTO: 85.2 FL (ref 80–99.9)
PDW BLD-RTO: 13.2 FL (ref 11.5–15)
PLATELET # BLD: 220 E9/L (ref 130–450)
PMV BLD AUTO: 12.1 FL (ref 7–12)
POTASSIUM SERPL-SCNC: 5.4 MMOL/L (ref 3.5–5)
RBC # BLD: 5.08 E12/L (ref 3.5–5.5)
SODIUM BLD-SCNC: 145 MMOL/L (ref 132–146)
TOTAL PROTEIN: 6.7 G/DL (ref 6.4–8.3)
TRIGL SERPL-MCNC: 205 MG/DL (ref 0–149)
VITAMIN D 25-HYDROXY: 42 NG/ML (ref 30–100)
VLDLC SERPL CALC-MCNC: 41 MG/DL
WBC # BLD: 5.2 E9/L (ref 4.5–11.5)

## 2022-01-26 ENCOUNTER — OFFICE VISIT (OUTPATIENT)
Dept: ORTHOPEDIC SURGERY | Age: 57
End: 2022-01-26
Payer: COMMERCIAL

## 2022-01-26 VITALS — HEIGHT: 63 IN | TEMPERATURE: 98 F | WEIGHT: 135 LBS | BODY MASS INDEX: 23.92 KG/M2

## 2022-01-26 DIAGNOSIS — M65.4 DE QUERVAIN'S TENOSYNOVITIS, LEFT: Primary | ICD-10-CM

## 2022-01-26 PROCEDURE — 99213 OFFICE O/P EST LOW 20 MIN: CPT | Performed by: ORTHOPAEDIC SURGERY

## 2022-01-26 NOTE — PROGRESS NOTES
Ford Medley is a 61 y.o. female, who presents   Chief Complaint   Patient presents with    Wrist Pain     left wrist pain started last week started after she was shoveling snow. HPI[de-identified] Left wrist pain is been present for a week or more. This apparently occurred after shoveling snow and the big have a fall here recently. She has pain along the radial side of the left wrist which is her nondominant hand. She is using some oral anti-inflammatories but has not used a brace on it. She has good motion no is uncomfortable with use. Allergies; medications; past medical, surgical, family, and social history; and problem list have been reviewed today and updated as indicated in this encounter - see below following Ortho specifics. Musculoskeletal: Skin condition gross neurovascular function good in the left upper extremity. Wrist motion is good and finger motion is good. There is some pain with mobilization and there is tenderness directly over the first dorsal compartment of the left wrist.  There is no yareli crepitus and no catching. There is no instability. There is no redness and minimal edema. Radiologic Studies: None    ASSESSMENT:  Caty Dwyer was seen today for wrist pain. Diagnoses and all orders for this visit:    Glorine Ashton tenosynovitis, left     Treatment alternatives were reviewed including medical and physical therapies, injections, and surgical options, expected risks benefits and likely outcome of each were discussed in detail, questions asked and answered and understood. We discussed the symptoms well as physical findings. This consistent with tenosynovitis first dorsal compartment or \"de Quervain's disease\". PLAN: We discussed treatment options and will go with the most conservative at present with oral anti-inflammatories and a brace.   The other options would be oral corticosteroids in the form of a Dosepak or injection in the tendon sheath with corticosteroids and if recalcitrant and chronic surgical release which were not entertaining at the present time.         Patient Active Problem List   Diagnosis    Mild persistent asthma without complication    Perennial allergic rhinitis with seasonal variation    Thoracic scoliosis    GERD with stricture    Depression    Restrictive lung disease    Thoracogenic scoliosis of thoracic region    Medial epicondylitis, right elbow    Mild persistent asthma with exacerbation    Need for prophylactic vaccination and inoculation against influenza    Hyperlipidemia    Jayson-Parkinson-White syndrome       Past Medical History:   Diagnosis Date    Asthma     Depression     GERD (gastroesophageal reflux disease)     Hyperlipidemia     Scoliosis     Obrien-Parkinson-White syndrome     onset age 28's  no problems in last 10 yrs       Past Surgical History:   Procedure Laterality Date    BREAST BIOPSY Right 2013    benign     SECTION      CYST REMOVAL      cyst removal from head    ELBOW DEBRIDEMENT Right 04/15/2021    HYSTERECTOMY      age 42 per pt, partial    SHOULDER SURGERY      manipulation    WRIST SURGERY Left 4/15/2021    DEBRIDEMENT RIGHT ELBOW (CPT 56460) performed by Ifeoma Spears DO at [address] [place] [address]       Current Outpatient Medications   Medication Sig Dispense Refill    atorvastatin (LIPITOR) 40 MG tablet TAKE 1 TABLET BY MOUTH EVERY DAY 90 tablet 1    budesonide-formoterol (SYMBICORT) 160-4.5 MCG/ACT AERO Inhale 2 puffs into the lungs 2 times daily 10.2 g 5    montelukast (SINGULAIR) 10 MG tablet Take 1 tablet by mouth nightly 30 tablet 5    Baloxavir Marboxil,40 MG Dose, (XOFLUZA, 40 MG DOSE,) 2 x 20 MG TBPK 2 tablets once for complete dose of therapy 1 each 2    calcium carbonate (OSCAL) 500 MG TABS tablet Take 500 mg by mouth daily      Omega-3 Fatty Acids (FISH OIL) 1000 MG CAPS Take 3,000 mg by mouth 3 times daily      DEXILANT 60 MG CPDR delayed release capsule Take 60 mg by mouth Emotionally Abused: Not on file    Physically Abused: Not on file    Sexually Abused: Not on file   Housing Stability:     Unable to Pay for Housing in the Last Year: Not on file    Number of Places Lived in the Last Year: Not on file    Unstable Housing in the Last Year: Not on file       Family History   Problem Relation Age of Onset    Breast Cancer Mother     Other Mother         ALS    Osteoporosis Mother     Other Father         living and healthy    Other Sister         2 sisters living and healthy    Osteoporosis Sister     Asthma Brother         living    Atopy Son         living         Review of Systems:   As follows except as previously noted in HPI:  Constitutional: Negative for chills, diaphoresis,  fever   Respiratory: Negative for cough, shortness of breath and wheezing. Cardiovascular: Negative for chest pain and palpitations. Neurological: Negative for dizziness, syncope,   GI / : abdominal pain or cramping  Musculoskeletal: see HPI       Objective:   Physical Exam   Constitutional: Oriented to person, place, and time. and appears well-developed and well-nourished. :   Head: Normocephalic and atraumatic. Neck: Neck supple. Eyes: EOM are normal.   Pulmonary/Chest: Effort normal.  No respiratory distress, no wheezes. Neurological: Alert and oriented to person  Skin: Skin is warm and dry. Alonso Ward DO    1/26/22  3:44 PM    All reasonable efforts have been made to minimize the risk of errors that may occur in the use of voice recognition and other electronic means of charting.

## 2022-01-31 ENCOUNTER — OFFICE VISIT (OUTPATIENT)
Dept: PRIMARY CARE CLINIC | Age: 57
End: 2022-01-31
Payer: COMMERCIAL

## 2022-01-31 VITALS
HEART RATE: 76 BPM | BODY MASS INDEX: 24.22 KG/M2 | DIASTOLIC BLOOD PRESSURE: 80 MMHG | OXYGEN SATURATION: 98 % | HEIGHT: 63 IN | SYSTOLIC BLOOD PRESSURE: 118 MMHG | WEIGHT: 136.7 LBS | TEMPERATURE: 97.8 F

## 2022-01-31 DIAGNOSIS — J01.10 ACUTE NON-RECURRENT FRONTAL SINUSITIS: Primary | ICD-10-CM

## 2022-01-31 PROCEDURE — 99213 OFFICE O/P EST LOW 20 MIN: CPT | Performed by: FAMILY MEDICINE

## 2022-01-31 RX ORDER — AMOXICILLIN AND CLAVULANATE POTASSIUM 875; 125 MG/1; MG/1
1 TABLET, FILM COATED ORAL 2 TIMES DAILY WITH MEALS
Qty: 20 TABLET | Refills: 0 | Status: SHIPPED | OUTPATIENT
Start: 2022-01-31 | End: 2022-02-10

## 2022-01-31 RX ORDER — METHYLPREDNISOLONE 4 MG/1
TABLET ORAL
Qty: 1 KIT | Refills: 0 | Status: SHIPPED | OUTPATIENT
Start: 2022-01-31 | End: 2022-02-06

## 2022-01-31 SDOH — ECONOMIC STABILITY: FOOD INSECURITY: WITHIN THE PAST 12 MONTHS, THE FOOD YOU BOUGHT JUST DIDN'T LAST AND YOU DIDN'T HAVE MONEY TO GET MORE.: NEVER TRUE

## 2022-01-31 SDOH — ECONOMIC STABILITY: FOOD INSECURITY: WITHIN THE PAST 12 MONTHS, YOU WORRIED THAT YOUR FOOD WOULD RUN OUT BEFORE YOU GOT MONEY TO BUY MORE.: NEVER TRUE

## 2022-01-31 ASSESSMENT — PATIENT HEALTH QUESTIONNAIRE - PHQ9
SUM OF ALL RESPONSES TO PHQ9 QUESTIONS 1 & 2: 0
10. IF YOU CHECKED OFF ANY PROBLEMS, HOW DIFFICULT HAVE THESE PROBLEMS MADE IT FOR YOU TO DO YOUR WORK, TAKE CARE OF THINGS AT HOME, OR GET ALONG WITH OTHER PEOPLE: 0
1. LITTLE INTEREST OR PLEASURE IN DOING THINGS: 0
5. POOR APPETITE OR OVEREATING: 0
2. FEELING DOWN, DEPRESSED OR HOPELESS: 0
SUM OF ALL RESPONSES TO PHQ QUESTIONS 1-9: 0
SUM OF ALL RESPONSES TO PHQ QUESTIONS 1-9: 0
7. TROUBLE CONCENTRATING ON THINGS, SUCH AS READING THE NEWSPAPER OR WATCHING TELEVISION: 0
SUM OF ALL RESPONSES TO PHQ QUESTIONS 1-9: 0
9. THOUGHTS THAT YOU WOULD BE BETTER OFF DEAD, OR OF HURTING YOURSELF: 0
3. TROUBLE FALLING OR STAYING ASLEEP: 0
SUM OF ALL RESPONSES TO PHQ QUESTIONS 1-9: 0
6. FEELING BAD ABOUT YOURSELF - OR THAT YOU ARE A FAILURE OR HAVE LET YOURSELF OR YOUR FAMILY DOWN: 0
4. FEELING TIRED OR HAVING LITTLE ENERGY: 0
8. MOVING OR SPEAKING SO SLOWLY THAT OTHER PEOPLE COULD HAVE NOTICED. OR THE OPPOSITE, BEING SO FIGETY OR RESTLESS THAT YOU HAVE BEEN MOVING AROUND A LOT MORE THAN USUAL: 0

## 2022-01-31 ASSESSMENT — SOCIAL DETERMINANTS OF HEALTH (SDOH): HOW HARD IS IT FOR YOU TO PAY FOR THE VERY BASICS LIKE FOOD, HOUSING, MEDICAL CARE, AND HEATING?: NOT HARD AT ALL

## 2022-01-31 NOTE — PROGRESS NOTES
Subjective:  61 y.o. female who presents to the office today with chief complaint:  Chief Complaint   Patient presents with    Cough     x1 day pt states its a dry cough     Sinus Problem     states has had sinus pressure x1week     Headache     pt states from1-10 her headaches are a 9 for x2 weeks      Cough/Sinus pressure/HA: Ongoing for a week. No known sick contacts. Vaccinated but not boosted for Covid-19. Bloody nose when she blows nose d/t dry air. Cough is non-productive. HA's are \"tension\"type down the back of her neck. Sinus pressure in frontal region. Has attempted allegra, advil sinus. Health Maintenance Due   Topic Date Due    Hepatitis C screen  Never done    HIV screen  Never done    Colon cancer screen colonoscopy  Never done    Shingles Vaccine (1 of 2) Never done    COVID-19 Vaccine (3 - Booster for Moderna series) 09/27/2021       Cancer History:  Family Cancer History:    Review of Systems    Review of Systems: All bolded are positive, all others are negative. General:  Fever, chills, diaphoresis, fatigue, malaise, night sweats, weight loss  Psychological:  Anxiety, disorientation, hallucinations. ENT:  Epistaxis, headaches, vertigo, visual changes. Cardiovascular:  Chest pain, irregular heartbeats, palpitations, paroxysmal nocturnal dyspnea. Respiratory:  Shortness of breath, coughing, sputum production, hemoptysis, wheezing, orthopnea.   Gastrointestinal:  Nausea, vomiting, diarrhea, heartburn, constipation, abdominal pain, hematemesis, hematochezia, melena, acholic stools  Genito-Urinary:  Dysuria, urgency, frequency, hematuria  Musculoskeletal:  Joint pain, joint stiffness, joint swelling, muscle pain  Neurology:  Headache, focal neurological deficits, weakness, numbness, paresthesia  Derm:  Rashes, ulcers, excoriations, bruising  Extremities:  Decreased ROM, peripheral edema, mottling      Objective:  Vitals:    01/31/22 1457   BP: 118/80   Pulse: 76   Temp: 97.8 °F (36.6 °C)   SpO2: 98%     Physical Exam  Constitutional:       General: She is not in acute distress. Appearance: She is well-developed. She is not diaphoretic. HENT:      Head: Normocephalic and atraumatic. Right Ear: Tympanic membrane, ear canal and external ear normal.      Left Ear: Tympanic membrane, ear canal and external ear normal.      Nose: Nose normal.      Mouth/Throat:      Pharynx: No oropharyngeal exudate. Eyes:      General:         Right eye: No discharge. Left eye: No discharge. Conjunctiva/sclera: Conjunctivae normal.      Pupils: Pupils are equal, round, and reactive to light. Cardiovascular:      Rate and Rhythm: Normal rate and regular rhythm. Heart sounds: Normal heart sounds. No murmur heard. No friction rub. No gallop. Pulmonary:      Effort: Pulmonary effort is normal. No respiratory distress. Breath sounds: Normal breath sounds. No wheezing or rales. Abdominal:      General: Bowel sounds are normal. There is no distension. Palpations: Abdomen is soft. Tenderness: There is no abdominal tenderness. There is no guarding or rebound. Musculoskeletal:      Cervical back: Normal range of motion and neck supple. Lymphadenopathy:      Cervical: No cervical adenopathy. Neurological:      Mental Status: She is alert and oriented to person, place, and time. Psychiatric:         Behavior: Behavior normal.         Thought Content:  Thought content normal.         Judgment: Judgment normal.           Results for orders placed or performed in visit on 01/13/22   Vitamin D 25 Hydroxy   Result Value Ref Range    Vit D, 25-Hydroxy 42 30 - 100 ng/mL   Lipid Panel   Result Value Ref Range    Cholesterol, Total 217 (H) 0 - 199 mg/dL    Triglycerides 205 (H) 0 - 149 mg/dL    HDL 59 >40 mg/dL    LDL Calculated 117 (H) 0 - 99 mg/dL    VLDL Cholesterol Calculated 41 mg/dL   Comprehensive Metabolic Panel, Fasting   Result Value Ref Range    Sodium 145 132 - 146 mmol/L    Potassium 5.4 (H) 3.5 - 5.0 mmol/L    Chloride 107 98 - 107 mmol/L    CO2 25 22 - 29 mmol/L    Anion Gap 13 7 - 16 mmol/L    Glucose, Fasting 92 74 - 99 mg/dL    BUN 10 6 - 20 mg/dL    CREATININE 1.0 0.5 - 1.0 mg/dL    GFR Non-African American 57 >=60 mL/min/1.73    GFR African American >60     Calcium 10.0 8.6 - 10.2 mg/dL    Total Protein 6.7 6.4 - 8.3 g/dL    Albumin 4.0 3.5 - 5.2 g/dL    Total Bilirubin 0.2 0.0 - 1.2 mg/dL    Alkaline Phosphatase 151 (H) 35 - 104 U/L    ALT 21 0 - 32 U/L    AST 27 0 - 31 U/L   CBC   Result Value Ref Range    WBC 5.2 4.5 - 11.5 E9/L    RBC 5.08 3.50 - 5.50 E12/L    Hemoglobin 13.1 11.5 - 15.5 g/dL    Hematocrit 43.3 34.0 - 48.0 %    MCV 85.2 80.0 - 99.9 fL    MCH 25.8 (L) 26.0 - 35.0 pg    MCHC 30.3 (L) 32.0 - 34.5 %    RDW 13.2 11.5 - 15.0 fL    Platelets 637 151 - 677 E9/L    MPV 12.1 (H) 7.0 - 12.0 fL         Assessment and Plan:  Tuyet was seen today for cough, sinus problem and headache. Diagnoses and all orders for this visit:    Acute non-recurrent frontal sinusitis  -     amoxicillin-clavulanate (AUGMENTIN) 875-125 MG per tablet; Take 1 tablet by mouth 2 times daily (with meals) for 10 days  -     methylPREDNISolone (MEDROL DOSEPACK) 4 MG tablet; Take by mouth. 1. Acute frontal sinusitis: Due to symptoms ongoing for a week, Augmentin 8 7 5 mg twice daily for 10 days will be prescribed along with Medrol Dosepak    Return if symptoms worsen or fail to improve. Patient may come in sooner if needed for medical concerns. Patient advised to call at any time to cancel, re-schedule, or for any questions/concerns.             Afshin Braun DO    1/31/22  9:25 AM

## 2022-02-16 ENCOUNTER — OFFICE VISIT (OUTPATIENT)
Dept: PRIMARY CARE CLINIC | Age: 57
End: 2022-02-16
Payer: COMMERCIAL

## 2022-02-16 VITALS
WEIGHT: 134.9 LBS | TEMPERATURE: 97.9 F | HEART RATE: 73 BPM | BODY MASS INDEX: 23.9 KG/M2 | HEIGHT: 63 IN | SYSTOLIC BLOOD PRESSURE: 120 MMHG | OXYGEN SATURATION: 97 % | DIASTOLIC BLOOD PRESSURE: 80 MMHG

## 2022-02-16 DIAGNOSIS — M99.01 SOMATIC DYSFUNCTION OF SPINE, CERVICAL: Primary | ICD-10-CM

## 2022-02-16 PROCEDURE — 99213 OFFICE O/P EST LOW 20 MIN: CPT | Performed by: FAMILY MEDICINE

## 2022-02-16 PROCEDURE — 98925 OSTEOPATH MANJ 1-2 REGIONS: CPT | Performed by: FAMILY MEDICINE

## 2022-02-16 NOTE — PROGRESS NOTES
Subjective:  61 y.o. female who presents to the office today with chief complaint:  Chief Complaint   Patient presents with    Other     Neck TX     Neck pain: Ongoing for several weeks. Worse with sitting at computer throughout the day. Does have headaches from. OMT typically resolves the pain. Pt requests OMT to be completed. Health Maintenance Due   Topic Date Due    Hepatitis C screen  Never done    HIV screen  Never done    Colorectal Cancer Screen  Never done    Shingles Vaccine (1 of 2) Never done    COVID-19 Vaccine (3 - Booster for Moderna series) 09/27/2021       Cancer History:  Family Cancer History:    Review of Systems    Review of Systems: All bolded are positive, all others are negative. General:  Fever, chills, diaphoresis, fatigue, malaise, night sweats, weight loss  Psychological:  Anxiety, disorientation, hallucinations. ENT:  Epistaxis, headaches, vertigo, visual changes. Cardiovascular:  Chest pain, irregular heartbeats, palpitations, paroxysmal nocturnal dyspnea. Respiratory:  Shortness of breath, coughing, sputum production, hemoptysis, wheezing, orthopnea. Gastrointestinal:  Nausea, vomiting, diarrhea, heartburn, constipation, abdominal pain, hematemesis, hematochezia, melena, acholic stools  Genito-Urinary:  Dysuria, urgency, frequency, hematuria  Musculoskeletal:  Joint pain, joint stiffness, joint swelling, muscle pain  Neurology:  Headache, focal neurological deficits, weakness, numbness, paresthesia  Derm:  Rashes, ulcers, excoriations, bruising  Extremities:  Decreased ROM, peripheral edema, mottling      Objective:  Vitals:    02/16/22 1504   BP: 120/80   Pulse: 73   Temp: 97.9 °F (36.6 °C)   SpO2: 97%     Physical Exam  Constitutional:       General: She is not in acute distress. Appearance: She is well-developed. She is not diaphoretic. HENT:      Head: Normocephalic and atraumatic.       Right Ear: Tympanic membrane, ear canal and external ear normal. Left Ear: Tympanic membrane, ear canal and external ear normal.      Nose: Nose normal.      Mouth/Throat:      Pharynx: No oropharyngeal exudate. Eyes:      General:         Right eye: No discharge. Left eye: No discharge. Conjunctiva/sclera: Conjunctivae normal.      Pupils: Pupils are equal, round, and reactive to light. Cardiovascular:      Rate and Rhythm: Normal rate and regular rhythm. Heart sounds: Normal heart sounds. No murmur heard. No friction rub. No gallop. Pulmonary:      Effort: Pulmonary effort is normal. No respiratory distress. Breath sounds: Normal breath sounds. No wheezing or rales. Abdominal:      General: Bowel sounds are normal. There is no distension. Palpations: Abdomen is soft. Tenderness: There is no abdominal tenderness. There is no guarding or rebound. Musculoskeletal:      Cervical back: Normal range of motion and neck supple. Lymphadenopathy:      Cervical: No cervical adenopathy. Neurological:      Mental Status: She is alert and oriented to person, place, and time. Psychiatric:         Behavior: Behavior normal.         Thought Content: Thought content normal.         Judgment: Judgment normal.       Osteopathic: Pt examined in sitting and supine. Cervical spine with C4-6 SR RR, tender B upper traps. Forward shoulder posture. Thoracic spine with decreased kyphosis and known right convexity.      Results for orders placed or performed in visit on 01/13/22   Vitamin D 25 Hydroxy   Result Value Ref Range    Vit D, 25-Hydroxy 42 30 - 100 ng/mL   Lipid Panel   Result Value Ref Range    Cholesterol, Total 217 (H) 0 - 199 mg/dL    Triglycerides 205 (H) 0 - 149 mg/dL    HDL 59 >40 mg/dL    LDL Calculated 117 (H) 0 - 99 mg/dL    VLDL Cholesterol Calculated 41 mg/dL   Comprehensive Metabolic Panel, Fasting   Result Value Ref Range    Sodium 145 132 - 146 mmol/L    Potassium 5.4 (H) 3.5 - 5.0 mmol/L    Chloride 107 98 - 107 mmol/L    CO2 25 22 - 29 mmol/L    Anion Gap 13 7 - 16 mmol/L    Glucose, Fasting 92 74 - 99 mg/dL    BUN 10 6 - 20 mg/dL    CREATININE 1.0 0.5 - 1.0 mg/dL    GFR Non-African American 57 >=60 mL/min/1.73    GFR African American >60     Calcium 10.0 8.6 - 10.2 mg/dL    Total Protein 6.7 6.4 - 8.3 g/dL    Albumin 4.0 3.5 - 5.2 g/dL    Total Bilirubin 0.2 0.0 - 1.2 mg/dL    Alkaline Phosphatase 151 (H) 35 - 104 U/L    ALT 21 0 - 32 U/L    AST 27 0 - 31 U/L   CBC   Result Value Ref Range    WBC 5.2 4.5 - 11.5 E9/L    RBC 5.08 3.50 - 5.50 E12/L    Hemoglobin 13.1 11.5 - 15.5 g/dL    Hematocrit 43.3 34.0 - 48.0 %    MCV 85.2 80.0 - 99.9 fL    MCH 25.8 (L) 26.0 - 35.0 pg    MCHC 30.3 (L) 32.0 - 34.5 %    RDW 13.2 11.5 - 15.0 fL    Platelets 593 435 - 305 E9/L    MPV 12.1 (H) 7.0 - 12.0 fL         Assessment and Plan:  Gianna Logan was seen today for other. Diagnoses and all orders for this visit:    Somatic dysfunction of spine, cervical  -     MO OSTEOPATHIC MANIP,1-2 BODY REGN        1. Cervical spine somatic dysfunction: OMT to cervical and upper thoracic spine including soft tissue mobilization and HVLA completed with good result. Return as needed for further OMT. Return if symptoms worsen or fail to improve. Patient may come in sooner if needed for medical concerns. Patient advised to call at any time to cancel, re-schedule, or for any questions/concerns.             Celestina Braun DO    1/31/22  6:00 AM

## 2022-08-30 ENCOUNTER — OFFICE VISIT (OUTPATIENT)
Dept: PRIMARY CARE CLINIC | Age: 57
End: 2022-08-30
Payer: COMMERCIAL

## 2022-08-30 VITALS
HEART RATE: 84 BPM | OXYGEN SATURATION: 98 % | SYSTOLIC BLOOD PRESSURE: 110 MMHG | HEIGHT: 63 IN | DIASTOLIC BLOOD PRESSURE: 70 MMHG | WEIGHT: 128 LBS | BODY MASS INDEX: 22.68 KG/M2 | TEMPERATURE: 97.4 F

## 2022-08-30 DIAGNOSIS — L74.511 PRIMARY FOCAL HYPERHIDROSIS OF FACE: ICD-10-CM

## 2022-08-30 DIAGNOSIS — M99.01 SOMATIC DYSFUNCTION OF SPINE, CERVICAL: Primary | ICD-10-CM

## 2022-08-30 PROCEDURE — 98925 OSTEOPATH MANJ 1-2 REGIONS: CPT | Performed by: FAMILY MEDICINE

## 2022-08-30 PROCEDURE — 99213 OFFICE O/P EST LOW 20 MIN: CPT | Performed by: FAMILY MEDICINE

## 2022-08-30 RX ORDER — VENLAFAXINE HYDROCHLORIDE 75 MG/1
CAPSULE, EXTENDED RELEASE ORAL
COMMUNITY
Start: 2022-07-28

## 2022-08-30 NOTE — PROGRESS NOTES
TABLET BY MOUTH 3 TIMES A DAY AS NEEDED DIZZINESS      Cholecalciferol (VITAMIN D) 2000 UNITS CAPS capsule Take  by mouth daily. No current facility-administered medications for this visit. Physical Exam  Musculoskeletal:      Cervical back: Spasms (Right side) and tenderness present. No swelling or edema. Decreased range of motion. Thoracic back: Spasms (Right side) and tenderness present. Comments: There is tenderness and spasm right thoracic and cervical paravertebral musculature with a tender OA on the right.           CBC  WBC   Date Value Ref Range Status   01/13/2022 5.2 4.5 - 11.5 E9/L Final     RBC   Date Value Ref Range Status   01/13/2022 5.08 3.50 - 5.50 E12/L Final     Hemoglobin   Date Value Ref Range Status   01/13/2022 13.1 11.5 - 15.5 g/dL Final     Hematocrit   Date Value Ref Range Status   01/13/2022 43.3 34.0 - 48.0 % Final     MCV   Date Value Ref Range Status   01/13/2022 85.2 80.0 - 99.9 fL Final     MCH   Date Value Ref Range Status   01/13/2022 25.8 (L) 26.0 - 35.0 pg Final     MCHC   Date Value Ref Range Status   01/13/2022 30.3 (L) 32.0 - 34.5 % Final     RDW   Date Value Ref Range Status   01/13/2022 13.2 11.5 - 15.0 fL Final     Platelets   Date Value Ref Range Status   01/13/2022 220 130 - 450 E9/L Final     MPV   Date Value Ref Range Status   01/13/2022 12.1 (H) 7.0 - 12.0 fL Final     Neutrophils %   Date Value Ref Range Status   11/05/2016 52 43 - 80 % Final     Lymphocytes %   Date Value Ref Range Status   11/05/2016 38 20 - 42 % Final     Monocytes %   Date Value Ref Range Status   11/05/2016 8 2 - 12 % Final     Eosinophils %   Date Value Ref Range Status   11/05/2016 1 0 - 6 % Final     Basophils %   Date Value Ref Range Status   11/05/2016 1 0 - 2 % Final     Neutrophils Absolute   Date Value Ref Range Status   11/05/2016 4.80 1.80 - 7.30 E9/L Final     Lymphocytes Absolute   Date Value Ref Range Status   11/05/2016 3.55 1.50 - 4.00 E9/L Final     Monocytes Absolute   Date Value Ref Range Status   11/05/2016 0.77 0.10 - 0.95 E9/L Final     Eosinophils Absolute   Date Value Ref Range Status   11/05/2016 0.11 0.05 - 0.50 E9/L Final     Basophils Absolute   Date Value Ref Range Status   11/05/2016 0.05 0.00 - 0.20 E9/L Final       CMP  Sodium   Date Value Ref Range Status   01/13/2022 145 132 - 146 mmol/L Final     Potassium   Date Value Ref Range Status   01/13/2022 5.4 (H) 3.5 - 5.0 mmol/L Final     Chloride   Date Value Ref Range Status   01/13/2022 107 98 - 107 mmol/L Final     CO2   Date Value Ref Range Status   01/13/2022 25 22 - 29 mmol/L Final     Anion Gap   Date Value Ref Range Status   01/13/2022 13 7 - 16 mmol/L Final     Glucose   Date Value Ref Range Status   11/05/2016 102 74 - 109 mg/dL Final   05/23/2011 82 70 - 110 mg/dL Final     BUN   Date Value Ref Range Status   01/13/2022 10 6 - 20 mg/dL Final     Creatinine   Date Value Ref Range Status   01/13/2022 1.0 0.5 - 1.0 mg/dL Final     GFR Non-   Date Value Ref Range Status   01/13/2022 57 >=60 mL/min/1.73 Final     Comment:     Chronic Kidney Disease: less than 60 ml/min/1.73 sq.m. Kidney Failure: less than 15 ml/min/1.73 sq.m. Results valid for patients 18 years and older.        GFR    Date Value Ref Range Status   01/13/2022 >60  Final     Calcium   Date Value Ref Range Status   01/13/2022 10.0 8.6 - 10.2 mg/dL Final     Total Protein   Date Value Ref Range Status   01/13/2022 6.7 6.4 - 8.3 g/dL Final     Albumin   Date Value Ref Range Status   01/13/2022 4.0 3.5 - 5.2 g/dL Final     Total Bilirubin   Date Value Ref Range Status   01/13/2022 0.2 0.0 - 1.2 mg/dL Final     Alkaline Phosphatase   Date Value Ref Range Status   01/13/2022 151 (H) 35 - 104 U/L Final     ALT   Date Value Ref Range Status   01/13/2022 21 0 - 32 U/L Final     AST   Date Value Ref Range Status   01/13/2022 27 0 - 31 U/L Final       TSH  Lab Results   Component Value Date    TSH 1.460 01/26/2015       A1C  No results found for: LABA1C    LIPID  Lab Results   Component Value Date    CHOL 217 (H) 01/13/2022    TRIG 205 (H) 01/13/2022    HDL 59 01/13/2022    LDLCALC 117 (H) 01/13/2022    LABVLDL 41 01/13/2022        No results found for this visit on 08/30/22. An electronic signature was used to authenticate this note.     --Kim Bhatti, DO

## 2022-09-07 ENCOUNTER — TELEPHONE (OUTPATIENT)
Dept: PRIMARY CARE CLINIC | Age: 57
End: 2022-09-07

## 2022-09-07 DIAGNOSIS — G90.8 HARLEQUIN SYNDROME: Primary | ICD-10-CM

## 2022-09-07 DIAGNOSIS — E55.9 VITAMIN D DEFICIENCY: ICD-10-CM

## 2022-09-07 DIAGNOSIS — E78.5 HYPERLIPIDEMIA, UNSPECIFIED HYPERLIPIDEMIA TYPE: ICD-10-CM

## 2022-09-07 PROBLEM — G90.89 HARLEQUIN SYNDROME: Status: ACTIVE | Noted: 2022-09-07

## 2022-09-07 RX ORDER — ATORVASTATIN CALCIUM 40 MG/1
TABLET, FILM COATED ORAL
Qty: 90 TABLET | Refills: 1 | Status: SHIPPED | OUTPATIENT
Start: 2022-09-07

## 2022-09-07 NOTE — TELEPHONE ENCOUNTER
Pt would like a req for labs.  Pt was scheduled for today however she called and stated she ate this am

## 2022-09-07 NOTE — TELEPHONE ENCOUNTER
Phone call to patient. 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 Neurology referral to [facility] placed. Patient agreeable to current plan. Labs ordered.

## 2022-09-16 ENCOUNTER — HOSPITAL ENCOUNTER (OUTPATIENT)
Age: 57
Discharge: HOME | End: 2022-09-16
Payer: COMMERCIAL

## 2022-09-16 DIAGNOSIS — E78.5 HYPERLIPIDEMIA, UNSPECIFIED HYPERLIPIDEMIA TYPE: ICD-10-CM

## 2022-09-16 DIAGNOSIS — G90.8 HARLEQUIN SYNDROME: ICD-10-CM

## 2022-09-16 DIAGNOSIS — E55.9 VITAMIN D DEFICIENCY: ICD-10-CM

## 2022-09-16 LAB
ALBUMIN SERPL-MCNC: 4.2 G/DL (ref 3.5–5.2)
ALP BLD-CCNC: 120 U/L (ref 35–104)
ALT SERPL-CCNC: 13 U/L (ref 0–32)
ANION GAP SERPL CALCULATED.3IONS-SCNC: 10 MMOL/L (ref 7–16)
AST SERPL-CCNC: 19 U/L (ref 0–31)
BILIRUB SERPL-MCNC: 0.3 MG/DL (ref 0–1.2)
BUN BLDV-MCNC: 14 MG/DL (ref 6–20)
CALCIUM SERPL-MCNC: 10.3 MG/DL (ref 8.6–10.2)
CHLORIDE BLD-SCNC: 104 MMOL/L (ref 98–107)
CHOLESTEROL, TOTAL: 179 MG/DL (ref 0–199)
CO2: 28 MMOL/L (ref 22–29)
CREAT SERPL-MCNC: 1 MG/DL (ref 0.5–1)
GFR AFRICAN AMERICAN: >60
GFR NON-AFRICAN AMERICAN: 57 ML/MIN/1.73
GLUCOSE FASTING: 91 MG/DL (ref 74–99)
HCT VFR BLD CALC: 44.6 % (ref 34–48)
HDLC SERPL-MCNC: 71 MG/DL
HEMOGLOBIN: 13.6 G/DL (ref 11.5–15.5)
LDL CHOLESTEROL CALCULATED: 72 MG/DL (ref 0–99)
MCH RBC QN AUTO: 26.4 PG (ref 26–35)
MCHC RBC AUTO-ENTMCNC: 30.5 % (ref 32–34.5)
MCV RBC AUTO: 86.6 FL (ref 80–99.9)
PDW BLD-RTO: 12.3 FL (ref 11.5–15)
PLATELET # BLD: 239 E9/L (ref 130–450)
PMV BLD AUTO: 10.8 FL (ref 7–12)
POTASSIUM SERPL-SCNC: 5.3 MMOL/L (ref 3.5–5)
RBC # BLD: 5.15 E12/L (ref 3.5–5.5)
SODIUM BLD-SCNC: 142 MMOL/L (ref 132–146)
TOTAL PROTEIN: 6.7 G/DL (ref 6.4–8.3)
TRIGL SERPL-MCNC: 179 MG/DL (ref 0–149)
TSH SERPL DL<=0.05 MIU/L-ACNC: 1.94 UIU/ML (ref 0.27–4.2)
VITAMIN D 25-HYDROXY: 55 NG/ML (ref 30–100)
VLDLC SERPL CALC-MCNC: 36 MG/DL
WBC # BLD: 5.6 E9/L (ref 4.5–11.5)

## 2022-09-16 PROCEDURE — 80053 COMPREHEN METABOLIC PANEL: CPT

## 2022-09-16 PROCEDURE — 84443 ASSAY THYROID STIM HORMONE: CPT

## 2022-09-16 PROCEDURE — 80061 LIPID PANEL: CPT

## 2022-09-16 PROCEDURE — 36415 COLL VENOUS BLD VENIPUNCTURE: CPT

## 2022-09-16 PROCEDURE — 85027 COMPLETE CBC AUTOMATED: CPT

## 2022-09-16 PROCEDURE — 82306 VITAMIN D 25 HYDROXY: CPT

## 2022-09-22 ENCOUNTER — HOSPITAL ENCOUNTER (OUTPATIENT)
Dept: GENERAL RADIOLOGY | Age: 57
Discharge: HOME | End: 2022-09-24
Payer: COMMERCIAL

## 2022-09-22 DIAGNOSIS — Z12.31 VISIT FOR SCREENING MAMMOGRAM: ICD-10-CM

## 2022-09-22 PROCEDURE — 77063 BREAST TOMOSYNTHESIS BI: CPT

## 2023-03-23 RX ORDER — ATORVASTATIN CALCIUM 40 MG/1
TABLET, FILM COATED ORAL
Qty: 90 TABLET | Refills: 1 | Status: SHIPPED | OUTPATIENT
Start: 2023-03-23

## 2023-07-07 SDOH — ECONOMIC STABILITY: FOOD INSECURITY: WITHIN THE PAST 12 MONTHS, THE FOOD YOU BOUGHT JUST DIDN'T LAST AND YOU DIDN'T HAVE MONEY TO GET MORE.: NEVER TRUE

## 2023-07-07 SDOH — ECONOMIC STABILITY: TRANSPORTATION INSECURITY
IN THE PAST 12 MONTHS, HAS LACK OF TRANSPORTATION KEPT YOU FROM MEETINGS, WORK, OR FROM GETTING THINGS NEEDED FOR DAILY LIVING?: NO

## 2023-07-07 SDOH — ECONOMIC STABILITY: INCOME INSECURITY: HOW HARD IS IT FOR YOU TO PAY FOR THE VERY BASICS LIKE FOOD, HOUSING, MEDICAL CARE, AND HEATING?: NOT HARD AT ALL

## 2023-07-07 SDOH — ECONOMIC STABILITY: FOOD INSECURITY: WITHIN THE PAST 12 MONTHS, YOU WORRIED THAT YOUR FOOD WOULD RUN OUT BEFORE YOU GOT MONEY TO BUY MORE.: NEVER TRUE

## 2023-07-07 SDOH — ECONOMIC STABILITY: HOUSING INSECURITY
IN THE LAST 12 MONTHS, WAS THERE A TIME WHEN YOU DID NOT HAVE A STEADY PLACE TO SLEEP OR SLEPT IN A SHELTER (INCLUDING NOW)?: NO

## 2023-07-07 ASSESSMENT — PATIENT HEALTH QUESTIONNAIRE - PHQ9
3. TROUBLE FALLING OR STAYING ASLEEP: 0
5. POOR APPETITE OR OVEREATING: NOT AT ALL
9. THOUGHTS THAT YOU WOULD BE BETTER OFF DEAD, OR OF HURTING YOURSELF: NOT AT ALL
10. IF YOU CHECKED OFF ANY PROBLEMS, HOW DIFFICULT HAVE THESE PROBLEMS MADE IT FOR YOU TO DO YOUR WORK, TAKE CARE OF THINGS AT HOME, OR GET ALONG WITH OTHER PEOPLE: 0
8. MOVING OR SPEAKING SO SLOWLY THAT OTHER PEOPLE COULD HAVE NOTICED. OR THE OPPOSITE - BEING SO FIDGETY OR RESTLESS THAT YOU HAVE BEEN MOVING AROUND A LOT MORE THAN USUAL: NOT AT ALL
5. POOR APPETITE OR OVEREATING: 0
9. THOUGHTS THAT YOU WOULD BE BETTER OFF DEAD, OR OF HURTING YOURSELF: 0
SUM OF ALL RESPONSES TO PHQ QUESTIONS 1-9: 1
SUM OF ALL RESPONSES TO PHQ9 QUESTIONS 1 & 2: 1
4. FEELING TIRED OR HAVING LITTLE ENERGY: NOT AT ALL
1. LITTLE INTEREST OR PLEASURE IN DOING THINGS: 1
8. MOVING OR SPEAKING SO SLOWLY THAT OTHER PEOPLE COULD HAVE NOTICED. OR THE OPPOSITE, BEING SO FIGETY OR RESTLESS THAT YOU HAVE BEEN MOVING AROUND A LOT MORE THAN USUAL: 0
6. FEELING BAD ABOUT YOURSELF - OR THAT YOU ARE A FAILURE OR HAVE LET YOURSELF OR YOUR FAMILY DOWN: 0
4. FEELING TIRED OR HAVING LITTLE ENERGY: 0
2. FEELING DOWN, DEPRESSED OR HOPELESS: 0
6. FEELING BAD ABOUT YOURSELF - OR THAT YOU ARE A FAILURE OR HAVE LET YOURSELF OR YOUR FAMILY DOWN: NOT AT ALL
7. TROUBLE CONCENTRATING ON THINGS, SUCH AS READING THE NEWSPAPER OR WATCHING TELEVISION: 0
SUM OF ALL RESPONSES TO PHQ QUESTIONS 1-9: 1
2. FEELING DOWN, DEPRESSED OR HOPELESS: NOT AT ALL
7. TROUBLE CONCENTRATING ON THINGS, SUCH AS READING THE NEWSPAPER OR WATCHING TELEVISION: NOT AT ALL
SUM OF ALL RESPONSES TO PHQ QUESTIONS 1-9: 1
10. IF YOU CHECKED OFF ANY PROBLEMS, HOW DIFFICULT HAVE THESE PROBLEMS MADE IT FOR YOU TO DO YOUR WORK, TAKE CARE OF THINGS AT HOME, OR GET ALONG WITH OTHER PEOPLE: NOT DIFFICULT AT ALL
1. LITTLE INTEREST OR PLEASURE IN DOING THINGS: SEVERAL DAYS
SUM OF ALL RESPONSES TO PHQ QUESTIONS 1-9: 1
SUM OF ALL RESPONSES TO PHQ QUESTIONS 1-9: 1
3. TROUBLE FALLING OR STAYING ASLEEP: NOT AT ALL

## 2023-07-10 ENCOUNTER — OFFICE VISIT (OUTPATIENT)
Dept: PRIMARY CARE CLINIC | Age: 58
End: 2023-07-10
Payer: COMMERCIAL

## 2023-07-10 VITALS
TEMPERATURE: 98.4 F | SYSTOLIC BLOOD PRESSURE: 116 MMHG | HEIGHT: 63 IN | HEART RATE: 100 BPM | WEIGHT: 132 LBS | BODY MASS INDEX: 23.39 KG/M2 | OXYGEN SATURATION: 95 % | DIASTOLIC BLOOD PRESSURE: 74 MMHG

## 2023-07-10 DIAGNOSIS — H81.10 BENIGN PAROXYSMAL POSITIONAL VERTIGO, UNSPECIFIED LATERALITY: ICD-10-CM

## 2023-07-10 DIAGNOSIS — M99.01 SOMATIC DYSFUNCTION OF SPINE, CERVICAL: Primary | ICD-10-CM

## 2023-07-10 DIAGNOSIS — L74.511 PRIMARY FOCAL HYPERHIDROSIS OF FACE: ICD-10-CM

## 2023-07-10 DIAGNOSIS — E55.9 VITAMIN D DEFICIENCY: ICD-10-CM

## 2023-07-10 DIAGNOSIS — G90.8 HARLEQUIN SYNDROME: ICD-10-CM

## 2023-07-10 DIAGNOSIS — E78.5 HYPERLIPIDEMIA, UNSPECIFIED HYPERLIPIDEMIA TYPE: ICD-10-CM

## 2023-07-10 PROCEDURE — 99213 OFFICE O/P EST LOW 20 MIN: CPT | Performed by: FAMILY MEDICINE

## 2023-07-10 PROCEDURE — 98925 OSTEOPATH MANJ 1-2 REGIONS: CPT | Performed by: FAMILY MEDICINE

## 2023-07-10 ASSESSMENT — ENCOUNTER SYMPTOMS
GASTROINTESTINAL NEGATIVE: 1
EYES NEGATIVE: 1
RESPIRATORY NEGATIVE: 1
COLOR CHANGE: 1

## 2023-07-10 NOTE — PROGRESS NOTES
Date Value Ref Range Status   11/05/2016 4.80 1.80 - 7.30 E9/L Final     Lymphocytes Absolute   Date Value Ref Range Status   11/05/2016 3.55 1.50 - 4.00 E9/L Final     Monocytes Absolute   Date Value Ref Range Status   11/05/2016 0.77 0.10 - 0.95 E9/L Final     Eosinophils Absolute   Date Value Ref Range Status   11/05/2016 0.11 0.05 - 0.50 E9/L Final     Basophils Absolute   Date Value Ref Range Status   11/05/2016 0.05 0.00 - 0.20 E9/L Final       CMP  Sodium   Date Value Ref Range Status   09/16/2022 142 132 - 146 mmol/L Final     Potassium   Date Value Ref Range Status   09/16/2022 5.3 (H) 3.5 - 5.0 mmol/L Final     Chloride   Date Value Ref Range Status   09/16/2022 104 98 - 107 mmol/L Final     CO2   Date Value Ref Range Status   09/16/2022 28 22 - 29 mmol/L Final     Anion Gap   Date Value Ref Range Status   09/16/2022 10 7 - 16 mmol/L Final     Glucose   Date Value Ref Range Status   11/05/2016 102 74 - 109 mg/dL Final   05/23/2011 82 70 - 110 mg/dL Final     BUN   Date Value Ref Range Status   09/16/2022 14 6 - 20 mg/dL Final     Creatinine   Date Value Ref Range Status   09/16/2022 1.0 0.5 - 1.0 mg/dL Final     GFR Non-   Date Value Ref Range Status   09/16/2022 57 >=60 mL/min/1.73 Final     Comment:     Chronic Kidney Disease: less than 60 ml/min/1.73 sq.m. Kidney Failure: less than 15 ml/min/1.73 sq.m. Results valid for patients 18 years and older.        GFR    Date Value Ref Range Status   09/16/2022 >60  Final     Calcium   Date Value Ref Range Status   09/16/2022 10.3 (H) 8.6 - 10.2 mg/dL Final     Total Protein   Date Value Ref Range Status   09/16/2022 6.7 6.4 - 8.3 g/dL Final     Albumin   Date Value Ref Range Status   09/16/2022 4.2 3.5 - 5.2 g/dL Final     Total Bilirubin   Date Value Ref Range Status   09/16/2022 0.3 0.0 - 1.2 mg/dL Final     Alkaline Phosphatase   Date Value Ref Range Status   09/16/2022 120 (H) 35 - 104 U/L Final     ALT   Date

## 2023-07-17 DIAGNOSIS — L74.511 PRIMARY FOCAL HYPERHIDROSIS OF FACE: ICD-10-CM

## 2023-07-17 DIAGNOSIS — G90.8 HARLEQUIN SYNDROME: ICD-10-CM

## 2023-07-17 DIAGNOSIS — E55.9 VITAMIN D DEFICIENCY: ICD-10-CM

## 2023-07-17 DIAGNOSIS — E78.5 HYPERLIPIDEMIA, UNSPECIFIED HYPERLIPIDEMIA TYPE: ICD-10-CM

## 2023-07-18 LAB
ALBUMIN SERPL-MCNC: 4.2 G/DL (ref 3.5–5.2)
ALP BLD-CCNC: 117 U/L (ref 35–104)
ALT SERPL-CCNC: 17 U/L (ref 0–32)
ANION GAP SERPL CALCULATED.3IONS-SCNC: 11 MMOL/L (ref 7–16)
AST SERPL-CCNC: 22 U/L (ref 0–31)
BILIRUB SERPL-MCNC: 0.2 MG/DL (ref 0–1.2)
BUN BLDV-MCNC: 13 MG/DL (ref 6–20)
CALCIUM SERPL-MCNC: 9.9 MG/DL (ref 8.6–10.2)
CHLORIDE BLD-SCNC: 105 MMOL/L (ref 98–107)
CHOLESTEROL: 205 MG/DL
CO2: 27 MMOL/L (ref 22–29)
CREAT SERPL-MCNC: 0.9 MG/DL (ref 0.5–1)
GFR SERPL CREATININE-BSD FRML MDRD: >60 ML/MIN/1.73M2
GLUCOSE BLD-MCNC: 81 MG/DL (ref 74–99)
HCT VFR BLD CALC: 46.5 % (ref 34–48)
HDLC SERPL-MCNC: 70 MG/DL
HEMOGLOBIN: 13.1 G/DL (ref 11.5–15.5)
LDL CHOLESTEROL: 82 MG/DL
MCH RBC QN AUTO: 25.8 PG (ref 26–35)
MCHC RBC AUTO-ENTMCNC: 28.2 G/DL (ref 32–34.5)
MCV RBC AUTO: 91.7 FL (ref 80–99.9)
PDW BLD-RTO: 13.6 % (ref 11.5–15)
PLATELET # BLD: 254 K/UL (ref 130–450)
PMV BLD AUTO: 11.5 FL (ref 7–12)
POTASSIUM SERPL-SCNC: 5 MMOL/L (ref 3.5–5)
RBC # BLD: 5.07 M/UL (ref 3.5–5.5)
SODIUM BLD-SCNC: 143 MMOL/L (ref 132–146)
TOTAL PROTEIN: 6.7 G/DL (ref 6.4–8.3)
TRIGL SERPL-MCNC: 264 MG/DL
VITAMIN D 25-HYDROXY: 57 NG/ML (ref 30–100)
VLDLC SERPL CALC-MCNC: 53 MG/DL
WBC # BLD: 6.1 K/UL (ref 4.5–11.5)

## 2023-09-05 RX ORDER — ATORVASTATIN CALCIUM 40 MG/1
TABLET, FILM COATED ORAL
Qty: 90 TABLET | Refills: 1 | Status: SHIPPED | OUTPATIENT
Start: 2023-09-05

## 2023-09-25 ENCOUNTER — HOSPITAL ENCOUNTER (OUTPATIENT)
Dept: GENERAL RADIOLOGY | Age: 58
Discharge: HOME | End: 2023-09-27
Payer: COMMERCIAL

## 2023-09-25 VITALS — BODY MASS INDEX: 23.04 KG/M2 | HEIGHT: 63 IN | WEIGHT: 130 LBS

## 2023-09-25 DIAGNOSIS — Z12.31 VISIT FOR SCREENING MAMMOGRAM: ICD-10-CM

## 2023-09-25 PROCEDURE — 77063 BREAST TOMOSYNTHESIS BI: CPT

## 2023-11-10 RX ORDER — ATORVASTATIN CALCIUM 40 MG/1
TABLET, FILM COATED ORAL
Qty: 90 TABLET | Refills: 1 | Status: SHIPPED | OUTPATIENT
Start: 2023-11-10

## 2023-11-10 NOTE — TELEPHONE ENCOUNTER
Refill will be sent to pharmacy. Please contact patient and inform her she will be due for an appointment and fasting labs in January.

## 2023-11-14 ENCOUNTER — HOSPITAL ENCOUNTER (EMERGENCY)
Age: 58
Discharge: HOME | End: 2023-11-14
Attending: FAMILY MEDICINE
Payer: COMMERCIAL

## 2023-11-14 VITALS
SYSTOLIC BLOOD PRESSURE: 118 MMHG | HEIGHT: 63 IN | WEIGHT: 130 LBS | BODY MASS INDEX: 23.04 KG/M2 | DIASTOLIC BLOOD PRESSURE: 79 MMHG | HEART RATE: 80 BPM | TEMPERATURE: 97.8 F | RESPIRATION RATE: 16 BRPM | OXYGEN SATURATION: 98 %

## 2023-11-14 DIAGNOSIS — S09.90XA CLOSED HEAD INJURY, INITIAL ENCOUNTER: Primary | ICD-10-CM

## 2023-11-14 DIAGNOSIS — S00.81XA ABRASION OF FOREHEAD, INITIAL ENCOUNTER: ICD-10-CM

## 2023-11-14 PROCEDURE — 99282 EMERGENCY DEPT VISIT SF MDM: CPT

## 2023-11-14 ASSESSMENT — PAIN DESCRIPTION - ORIENTATION: ORIENTATION: RIGHT

## 2023-11-14 ASSESSMENT — PAIN SCALES - GENERAL: PAINLEVEL_OUTOF10: 8

## 2023-11-14 ASSESSMENT — PAIN - FUNCTIONAL ASSESSMENT: PAIN_FUNCTIONAL_ASSESSMENT: 0-10

## 2023-11-14 ASSESSMENT — PAIN DESCRIPTION - LOCATION: LOCATION: HEAD

## 2023-11-14 ASSESSMENT — PAIN DESCRIPTION - DESCRIPTORS: DESCRIPTORS: PRESSURE

## 2024-03-04 ASSESSMENT — PATIENT HEALTH QUESTIONNAIRE - PHQ9
SUM OF ALL RESPONSES TO PHQ9 QUESTIONS 1 & 2: 0
3. TROUBLE FALLING OR STAYING ASLEEP: 0
2. FEELING DOWN, DEPRESSED OR HOPELESS: NOT AT ALL
8. MOVING OR SPEAKING SO SLOWLY THAT OTHER PEOPLE COULD HAVE NOTICED. OR THE OPPOSITE, BEING SO FIGETY OR RESTLESS THAT YOU HAVE BEEN MOVING AROUND A LOT MORE THAN USUAL: 0
2. FEELING DOWN, DEPRESSED OR HOPELESS: 0
SUM OF ALL RESPONSES TO PHQ QUESTIONS 1-9: 1
9. THOUGHTS THAT YOU WOULD BE BETTER OFF DEAD, OR OF HURTING YOURSELF: 0
8. MOVING OR SPEAKING SO SLOWLY THAT OTHER PEOPLE COULD HAVE NOTICED. OR THE OPPOSITE - BEING SO FIDGETY OR RESTLESS THAT YOU HAVE BEEN MOVING AROUND A LOT MORE THAN USUAL: NOT AT ALL
SUM OF ALL RESPONSES TO PHQ QUESTIONS 1-9: 1
SUM OF ALL RESPONSES TO PHQ QUESTIONS 1-9: 1
3. TROUBLE FALLING OR STAYING ASLEEP: NOT AT ALL
6. FEELING BAD ABOUT YOURSELF - OR THAT YOU ARE A FAILURE OR HAVE LET YOURSELF OR YOUR FAMILY DOWN: NOT AT ALL
6. FEELING BAD ABOUT YOURSELF - OR THAT YOU ARE A FAILURE OR HAVE LET YOURSELF OR YOUR FAMILY DOWN: 0
4. FEELING TIRED OR HAVING LITTLE ENERGY: SEVERAL DAYS
5. POOR APPETITE OR OVEREATING: NOT AT ALL
10. IF YOU CHECKED OFF ANY PROBLEMS, HOW DIFFICULT HAVE THESE PROBLEMS MADE IT FOR YOU TO DO YOUR WORK, TAKE CARE OF THINGS AT HOME, OR GET ALONG WITH OTHER PEOPLE: 0
1. LITTLE INTEREST OR PLEASURE IN DOING THINGS: NOT AT ALL
10. IF YOU CHECKED OFF ANY PROBLEMS, HOW DIFFICULT HAVE THESE PROBLEMS MADE IT FOR YOU TO DO YOUR WORK, TAKE CARE OF THINGS AT HOME, OR GET ALONG WITH OTHER PEOPLE: NOT DIFFICULT AT ALL
7. TROUBLE CONCENTRATING ON THINGS, SUCH AS READING THE NEWSPAPER OR WATCHING TELEVISION: 0
4. FEELING TIRED OR HAVING LITTLE ENERGY: 1
SUM OF ALL RESPONSES TO PHQ QUESTIONS 1-9: 1
SUM OF ALL RESPONSES TO PHQ QUESTIONS 1-9: 1
7. TROUBLE CONCENTRATING ON THINGS, SUCH AS READING THE NEWSPAPER OR WATCHING TELEVISION: NOT AT ALL
9. THOUGHTS THAT YOU WOULD BE BETTER OFF DEAD, OR OF HURTING YOURSELF: NOT AT ALL
5. POOR APPETITE OR OVEREATING: 0
1. LITTLE INTEREST OR PLEASURE IN DOING THINGS: 0

## 2024-03-06 ENCOUNTER — OFFICE VISIT (OUTPATIENT)
Dept: PRIMARY CARE CLINIC | Age: 59
End: 2024-03-06
Payer: COMMERCIAL

## 2024-03-06 VITALS
WEIGHT: 139 LBS | HEART RATE: 82 BPM | SYSTOLIC BLOOD PRESSURE: 120 MMHG | OXYGEN SATURATION: 97 % | HEIGHT: 63 IN | TEMPERATURE: 98 F | BODY MASS INDEX: 24.63 KG/M2 | DIASTOLIC BLOOD PRESSURE: 80 MMHG

## 2024-03-06 DIAGNOSIS — G44.209 MUSCLE CONTRACTION HEADACHE: Primary | ICD-10-CM

## 2024-03-06 DIAGNOSIS — M99.01 SOMATIC DYSFUNCTION OF SPINE, CERVICAL: ICD-10-CM

## 2024-03-06 DIAGNOSIS — G51.8 FACIAL NEURALGIA: ICD-10-CM

## 2024-03-06 PROCEDURE — 98925 OSTEOPATH MANJ 1-2 REGIONS: CPT | Performed by: FAMILY MEDICINE

## 2024-03-06 PROCEDURE — 99213 OFFICE O/P EST LOW 20 MIN: CPT | Performed by: FAMILY MEDICINE

## 2024-03-06 RX ORDER — METHYLPREDNISOLONE 4 MG/1
TABLET ORAL
Qty: 1 KIT | Refills: 0 | Status: SHIPPED | OUTPATIENT
Start: 2024-03-06

## 2024-03-06 ASSESSMENT — ENCOUNTER SYMPTOMS
EYES NEGATIVE: 1
GASTROINTESTINAL NEGATIVE: 1
COLOR CHANGE: 1
RESPIRATORY NEGATIVE: 1

## 2024-03-06 NOTE — PROGRESS NOTES
is oriented to person, place, and time. Mental status is at baseline.      Cranial Nerves: No cranial nerve deficit.   Psychiatric:         Mood and Affect: Mood normal.         Behavior: Behavior normal.         Thought Content: Thought content normal.         Judgment: Judgment normal.            CBC  WBC   Date Value Ref Range Status   07/17/2023 6.1 4.5 - 11.5 k/uL Final     RBC   Date Value Ref Range Status   07/17/2023 5.07 3.50 - 5.50 m/uL Final     Hemoglobin   Date Value Ref Range Status   07/17/2023 13.1 11.5 - 15.5 g/dL Final     Hematocrit   Date Value Ref Range Status   07/17/2023 46.5 34.0 - 48.0 % Final     MCV   Date Value Ref Range Status   07/17/2023 91.7 80.0 - 99.9 fL Final     MCH   Date Value Ref Range Status   07/17/2023 25.8 (L) 26.0 - 35.0 pg Final     MCHC   Date Value Ref Range Status   07/17/2023 28.2 (L) 32.0 - 34.5 g/dL Final     RDW   Date Value Ref Range Status   07/17/2023 13.6 11.5 - 15.0 % Final     Platelets   Date Value Ref Range Status   07/17/2023 254 130 - 450 k/uL Final     MPV   Date Value Ref Range Status   07/17/2023 11.5 7.0 - 12.0 fL Final     Neutrophils %   Date Value Ref Range Status   11/05/2016 52 43 - 80 % Final     Lymphocytes %   Date Value Ref Range Status   11/05/2016 38 20 - 42 % Final     Monocytes %   Date Value Ref Range Status   11/05/2016 8 2 - 12 % Final     Eosinophils %   Date Value Ref Range Status   11/05/2016 1 0 - 6 % Final     Basophils %   Date Value Ref Range Status   11/05/2016 1 0 - 2 % Final     Neutrophils Absolute   Date Value Ref Range Status   11/05/2016 4.80 1.80 - 7.30 E9/L Final     Lymphocytes Absolute   Date Value Ref Range Status   11/05/2016 3.55 1.50 - 4.00 E9/L Final     Monocytes Absolute   Date Value Ref Range Status   11/05/2016 0.77 0.10 - 0.95 E9/L Final     Eosinophils Absolute   Date Value Ref Range Status   11/05/2016 0.11 0.05 - 0.50 E9/L Final     Basophils Absolute   Date Value Ref Range Status   11/05/2016 0.05 0.00 -

## 2024-07-08 ENCOUNTER — OFFICE VISIT (OUTPATIENT)
Dept: PRIMARY CARE CLINIC | Age: 59
End: 2024-07-08
Payer: COMMERCIAL

## 2024-07-08 VITALS
HEART RATE: 80 BPM | OXYGEN SATURATION: 98 % | BODY MASS INDEX: 23.57 KG/M2 | WEIGHT: 133 LBS | DIASTOLIC BLOOD PRESSURE: 80 MMHG | TEMPERATURE: 98 F | HEIGHT: 63 IN | SYSTOLIC BLOOD PRESSURE: 112 MMHG

## 2024-07-08 DIAGNOSIS — E78.5 HYPERLIPIDEMIA, UNSPECIFIED HYPERLIPIDEMIA TYPE: Primary | ICD-10-CM

## 2024-07-08 DIAGNOSIS — G90.8 HARLEQUIN SYNDROME: ICD-10-CM

## 2024-07-08 DIAGNOSIS — E55.9 VITAMIN D DEFICIENCY: ICD-10-CM

## 2024-07-08 DIAGNOSIS — H81.10 BENIGN PAROXYSMAL POSITIONAL VERTIGO, UNSPECIFIED LATERALITY: ICD-10-CM

## 2024-07-08 DIAGNOSIS — Z11.59 ENCOUNTER FOR HEPATITIS C SCREENING TEST FOR LOW RISK PATIENT: ICD-10-CM

## 2024-07-08 LAB
ALBUMIN: 4.3 G/DL (ref 3.5–5.2)
ALP BLD-CCNC: 126 U/L (ref 35–104)
ALT SERPL-CCNC: 13 U/L (ref 0–32)
ANION GAP SERPL CALCULATED.3IONS-SCNC: 10 MMOL/L (ref 7–16)
AST SERPL-CCNC: 25 U/L (ref 0–31)
BASOPHILS ABSOLUTE: 0 K/UL (ref 0–0.2)
BASOPHILS RELATIVE PERCENT: 0 % (ref 0–2)
BILIRUB SERPL-MCNC: 0.3 MG/DL (ref 0–1.2)
BUN BLDV-MCNC: 13 MG/DL (ref 6–23)
CALCIUM SERPL-MCNC: 10.2 MG/DL (ref 8.6–10.2)
CHLORIDE BLD-SCNC: 104 MMOL/L (ref 98–107)
CHOLESTEROL, TOTAL: 184 MG/DL
CO2: 27 MMOL/L (ref 22–29)
CREAT SERPL-MCNC: 0.9 MG/DL (ref 0.5–1)
EOSINOPHILS ABSOLUTE: 0.1 K/UL (ref 0.05–0.5)
EOSINOPHILS RELATIVE PERCENT: 2 % (ref 0–6)
GFR, ESTIMATED: 77 ML/MIN/1.73M2
GLUCOSE FASTING: 88 MG/DL (ref 74–99)
HCT VFR BLD CALC: 49.2 % (ref 34–48)
HDLC SERPL-MCNC: 84 MG/DL
HEMOGLOBIN: 13.7 G/DL (ref 11.5–15.5)
LDL CHOLESTEROL: 67 MG/DL
LYMPHOCYTES ABSOLUTE: 1.81 K/UL (ref 1.5–4)
LYMPHOCYTES RELATIVE PERCENT: 31 % (ref 20–42)
MCH RBC QN AUTO: 24.8 PG (ref 26–35)
MCHC RBC AUTO-ENTMCNC: 27.8 G/DL (ref 32–34.5)
MCV RBC AUTO: 89.1 FL (ref 80–99.9)
MONOCYTES ABSOLUTE: 0.62 K/UL (ref 0.1–0.95)
MONOCYTES RELATIVE PERCENT: 11 % (ref 2–12)
NEUTROPHILS ABSOLUTE: 3.36 K/UL (ref 1.8–7.3)
NEUTROPHILS RELATIVE PERCENT: 57 % (ref 43–80)
PDW BLD-RTO: 13.9 % (ref 11.5–15)
PLATELET # BLD: 238 K/UL (ref 130–450)
PMV BLD AUTO: 11.5 FL (ref 7–12)
POTASSIUM SERPL-SCNC: 5.7 MMOL/L (ref 3.5–5)
RBC # BLD: 5.52 M/UL (ref 3.5–5.5)
RBC # BLD: ABNORMAL 10*6/UL
SODIUM BLD-SCNC: 141 MMOL/L (ref 132–146)
TOTAL PROTEIN: 7.3 G/DL (ref 6.4–8.3)
TRIGL SERPL-MCNC: 167 MG/DL
TSH SERPL DL<=0.05 MIU/L-ACNC: 2.91 UIU/ML (ref 0.27–4.2)
VITAMIN D 25-HYDROXY: 53.1 NG/ML (ref 30–100)
VLDLC SERPL CALC-MCNC: 33 MG/DL
WBC # BLD: 5.9 K/UL (ref 4.5–11.5)

## 2024-07-08 PROCEDURE — 36415 COLL VENOUS BLD VENIPUNCTURE: CPT | Performed by: FAMILY MEDICINE

## 2024-07-08 PROCEDURE — 99214 OFFICE O/P EST MOD 30 MIN: CPT | Performed by: FAMILY MEDICINE

## 2024-07-08 RX ORDER — MECLIZINE HYDROCHLORIDE 25 MG/1
TABLET ORAL
Qty: 30 TABLET | Refills: 1 | Status: SHIPPED | OUTPATIENT
Start: 2024-07-08

## 2024-07-08 RX ORDER — ATORVASTATIN CALCIUM 40 MG/1
40 TABLET, FILM COATED ORAL DAILY
Qty: 90 TABLET | Refills: 1 | Status: SHIPPED | OUTPATIENT
Start: 2024-07-08

## 2024-07-08 RX ORDER — ATORVASTATIN CALCIUM 40 MG/1
TABLET, FILM COATED ORAL
Qty: 90 TABLET | Refills: 1 | OUTPATIENT
Start: 2024-07-08

## 2024-07-08 SDOH — ECONOMIC STABILITY: FOOD INSECURITY: WITHIN THE PAST 12 MONTHS, THE FOOD YOU BOUGHT JUST DIDN'T LAST AND YOU DIDN'T HAVE MONEY TO GET MORE.: NEVER TRUE

## 2024-07-08 SDOH — ECONOMIC STABILITY: FOOD INSECURITY: WITHIN THE PAST 12 MONTHS, YOU WORRIED THAT YOUR FOOD WOULD RUN OUT BEFORE YOU GOT MONEY TO BUY MORE.: NEVER TRUE

## 2024-07-08 SDOH — ECONOMIC STABILITY: INCOME INSECURITY: HOW HARD IS IT FOR YOU TO PAY FOR THE VERY BASICS LIKE FOOD, HOUSING, MEDICAL CARE, AND HEATING?: NOT HARD AT ALL

## 2024-07-08 ASSESSMENT — ENCOUNTER SYMPTOMS
COUGH: 0
ABDOMINAL PAIN: 0
EYE PAIN: 0
SINUS PRESSURE: 0
EYE DISCHARGE: 0
RHINORRHEA: 0
WHEEZING: 0
NAUSEA: 0
PHOTOPHOBIA: 0
VOMITING: 0
SHORTNESS OF BREATH: 0
DIARRHEA: 0
EYE ITCHING: 0
ABDOMINAL DISTENTION: 0
SORE THROAT: 0
FACIAL SWELLING: 0
CONSTIPATION: 0
BLOOD IN STOOL: 0
COLOR CHANGE: 0

## 2024-07-08 NOTE — PATIENT INSTRUCTIONS
ball between your legs and above your head.  Sit down and then stand up. Repeat. Turn around in a [place] a different way each time you stand.  With someone close by to help you, try the above exercises with your eyes closed.  Exercise 5  In a room that is cleared of obstacles:  Walk to a corner of the room, turn to your right, and walk back to the starting point. Now, repeat and turn left.  Walk up and down a slope. Now try stairs.  While holding on to someone's arm, try these exercises with your eyes closed.  When should you call for help?  Watch closely for changes in your health, and be sure to contact your doctor if:    You do not get better as expected.   Current as of: September 27, 2023  Content Version: 14.1  © 2006-2024 [employer].   Care instructions adapted under license by [employer]. If you have questions about a medical condition or this instruction, always ask your healthcare professional. [employer] disclaims any warranty or liability for your use of this information.

## 2024-07-08 NOTE — PROGRESS NOTES
Tuyet Colunga (:  1965) is a 59 y.o. female,Established patient, here for evaluation of the following chief complaint(s):  Follow-up (Labs) and Dizziness (Pt states her vertigo has been constant lately)      Assessment & Plan   ASSESSMENT/PLAN:  1. Hyperlipidemia, unspecified hyperlipidemia type  -     Comprehensive Metabolic Panel, Fasting  -     Lipid Panel  -     TSH  -     atorvastatin (LIPITOR) 40 MG tablet; Take 1 tablet by mouth daily, Disp-90 tablet, R-1Normal  2. Benign paroxysmal positional vertigo, unspecified laterality  -     meclizine (ANTIVERT) 25 MG tablet; TAKE 1 TABLET BY MOUTH 3 TIMES A DAY AS NEEDED DIZZINESS, Disp-30 tablet, R-1Normal  3. Vitamin D deficiency  -     Comprehensive Metabolic Panel, Fasting  -     Vitamin D 25 Hydroxy  4. Harlequin syndrome  -     Comprehensive Metabolic Panel, Fasting  -     TSH  -     CBC with Auto Differential  5. Encounter for hepatitis C screening test for low risk patient  -     Hepatitis C Antibody      PLAN:  Mammogram due after 2024 per Dr. Gongora.  Patient states she will be scheduling a colonoscopy with Dr. Nice.  Ashok exercises issued.  Advise meclizine 25 mg one half tab 3 times daily as needed sparingly.  Consider AWV next visit.  Patient will check with insurance.  Labs ordered.    Return in about 6 months (around 2025) for Labs, AWV.         Subjective   SUBJECTIVE/OBJECTIVE:  Patient here for 6-month follow-up.  She offers no complaints at the present time.  Does admit to some vertigo primarily when rolling over in bed or rapid head movements.  Currently taking meclizine 25 mg one half tab daily in the a.m.  Under some stress due to the fact that her son is getting .  She has a 3-year-old grandchild.        Review of Systems   Constitutional:  Negative for chills, fatigue and fever.   HENT:  Negative for congestion, ear discharge, ear pain, facial swelling, hearing loss, nosebleeds, rhinorrhea, sinus pressure and

## 2024-07-09 LAB — HEPATITIS C ANTIBODY: NONREACTIVE

## 2024-09-26 ENCOUNTER — HOSPITAL ENCOUNTER (OUTPATIENT)
Dept: GENERAL RADIOLOGY | Age: 59
Discharge: HOME | End: 2024-09-28
Payer: COMMERCIAL

## 2024-09-26 VITALS — WEIGHT: 130 LBS | HEIGHT: 63 IN | BODY MASS INDEX: 23.04 KG/M2

## 2024-09-26 DIAGNOSIS — Z12.31 ENCOUNTER FOR SCREENING MAMMOGRAM FOR MALIGNANT NEOPLASM OF BREAST: ICD-10-CM

## 2024-09-26 PROCEDURE — 77063 BREAST TOMOSYNTHESIS BI: CPT

## 2024-10-04 ENCOUNTER — TELEPHONE (OUTPATIENT)
Dept: PRIMARY CARE CLINIC | Age: 59
End: 2024-10-04

## 2024-10-04 RX ORDER — BUDESONIDE AND FORMOTEROL FUMARATE DIHYDRATE 160; 4.5 UG/1; UG/1
2 AEROSOL RESPIRATORY (INHALATION) 2 TIMES DAILY
Qty: 10.2 G | Refills: 1 | Status: SHIPPED | OUTPATIENT
Start: 2024-10-04

## 2024-10-04 NOTE — TELEPHONE ENCOUNTER
Refill request      budesonide-formoterol (SYMBICORT) 160-4.5 MCG/ACT AERO   Si puffs bid      [facility]      Pt states that she normally has this filled by Dr. Leiva, but his office is closed for 2 weeks  (I did confirm, calling their office, there is just a recording that states the office is closed 10/2-10/15)    Able to fill?      Last Appt: 24  Next Appt: 1/15/25

## 2024-12-05 RX ORDER — BUDESONIDE AND FORMOTEROL FUMARATE DIHYDRATE 160; 4.5 UG/1; UG/1
2 AEROSOL RESPIRATORY (INHALATION) 2 TIMES DAILY
Qty: 10.2 EACH | Refills: 1 | Status: SHIPPED | OUTPATIENT
Start: 2024-12-05

## 2024-12-05 NOTE — TELEPHONE ENCOUNTER
Name of Medication(s) Requested:  Requested Prescriptions     Pending Prescriptions Disp Refills    budesonide-formoterol (SYMBICORT) 160-4.5 MCG/ACT AERO [Pharmacy Med Name: BUDESONIDE-FORMOTEROL 160-4.5] 10.2 each 1     Sig: INHALE 2 PUFFS INTO THE LUNGS TWICE A DAY       Medication is on current medication list Yes    Dosage and directions were verified? Yes    Quantity verified: 30 day supply     Pharmacy Verified?  Yes    Last Appointment:  7/8/2024    Future appts:  Future Appointments   Date Time Provider Department Center   12/11/2024 11:00 AM Portillo Leiva DO [facility] None   1/15/2025  7:30 AM Je Montalvo DO [facility]        (If no appt send self scheduling link. .REFILLAPPT)  Scheduling request sent?     [] Yes  [x] No    Does patient need updated?  [] Yes  [x] No

## 2024-12-10 ENCOUNTER — OFFICE VISIT (OUTPATIENT)
Dept: PRIMARY CARE CLINIC | Age: 59
End: 2024-12-10
Payer: COMMERCIAL

## 2024-12-10 VITALS
HEART RATE: 89 BPM | OXYGEN SATURATION: 98 % | HEIGHT: 63 IN | TEMPERATURE: 98.2 F | WEIGHT: 136 LBS | BODY MASS INDEX: 24.1 KG/M2 | SYSTOLIC BLOOD PRESSURE: 118 MMHG | DIASTOLIC BLOOD PRESSURE: 84 MMHG

## 2024-12-10 DIAGNOSIS — L30.9 ECZEMA, UNSPECIFIED TYPE: Primary | ICD-10-CM

## 2024-12-10 PROCEDURE — 99213 OFFICE O/P EST LOW 20 MIN: CPT | Performed by: FAMILY MEDICINE

## 2024-12-10 RX ORDER — CETIRIZINE HYDROCHLORIDE 10 MG/1
10 TABLET ORAL DAILY
Qty: 30 TABLET | Refills: 2 | Status: SHIPPED | OUTPATIENT
Start: 2024-12-10

## 2024-12-10 RX ORDER — TRIAMCINOLONE ACETONIDE 1 MG/G
CREAM TOPICAL
Qty: 45 G | Refills: 2 | Status: SHIPPED | OUTPATIENT
Start: 2024-12-10

## 2024-12-10 ASSESSMENT — ENCOUNTER SYMPTOMS
RESPIRATORY NEGATIVE: 1
EYES NEGATIVE: 1
GASTROINTESTINAL NEGATIVE: 1

## 2024-12-10 NOTE — PROGRESS NOTES
Tuyet Colunga (:  1965) is a 59 y.o. female,Established patient, here for evaluation of the following chief complaint(s):  Rash (Pt complains of having red itchy rash neck and L abdominal area past several months. Denies change daily habits)      Assessment & Plan   ASSESSMENT/PLAN:  1. Eczema, unspecified type  -     cetirizine (ZYRTEC) 10 MG tablet; Take 1 tablet by mouth daily, Disp-30 tablet, R-2Normal        PLAN:  Kenalog cream 0.1% twice daily as needed itching.  Zyrtec 10 mg daily.    DC Allegra-D  May continue Aveeno moisturizing cream daily after bathing.  Dove soap recommended.  Seeing Dr. Leiva 2024    Return in 5 weeks (on 1/15/2025) for Labs, AWV.         Subjective   SUBJECTIVE/OBJECTIVE:  Patient here for an Acute Care Visit.  She Complains of a Pruritic Rash on Her Neck and Left Abdominal Area over the past Several Months.  Lesion of the Left Abdomen Just Started Last Week.  She Takes Allegra-D 1 Daily Which Has Not Really Helped.  She Denies Any Change in Her Daily Habits.        Review of Systems   Constitutional: Negative.    HENT: Negative.     Eyes: Negative.    Respiratory: Negative.     Cardiovascular: Negative.    Gastrointestinal: Negative.    Genitourinary: Negative.    Musculoskeletal: Negative.    Skin:  Positive for rash.   Neurological:  Positive for dizziness.   Psychiatric/Behavioral: Negative.          Immunization History   Administered Date(s) Administered    COVID-19, MODERNA BLUE border, Primary or Immunocompromised, (age 12y+), IM, 100 mcg/0.5mL 2021, 2021    Influenza Virus Vaccine 10/12/2015, 10/30/2016    Influenza, FLUARIX, FLULAVAL, FLUZONE (age 6 mo+) and AFLURIA, (age 3 y+), Quadv PF, 0.5mL 2017, 10/22/2019, 10/26/2020, 12/15/2021, 2022    Influenza, FLUCELVAX, (age 6 mo+) IM, Trivalent PF, 0.5mL 2024    Influenza, FLUCELVAX, (age 6 mo+), MDCK, Quadv PF, 0.5mL 10/29/2018, 10/31/2023    Pneumococcal, PPSV23, PNEUMOVAX 23,

## 2024-12-15 PROBLEM — J45.40: Status: ACTIVE | Noted: 2024-12-15

## 2024-12-19 DIAGNOSIS — E78.5 HYPERLIPIDEMIA, UNSPECIFIED HYPERLIPIDEMIA TYPE: ICD-10-CM

## 2024-12-20 RX ORDER — ATORVASTATIN CALCIUM 40 MG/1
40 TABLET, FILM COATED ORAL DAILY
Qty: 90 TABLET | Refills: 1 | Status: SHIPPED | OUTPATIENT
Start: 2024-12-20

## 2024-12-20 NOTE — TELEPHONE ENCOUNTER
Name of Medication(s) Requested:  Requested Prescriptions     Pending Prescriptions Disp Refills    atorvastatin (LIPITOR) 40 MG tablet [Pharmacy Med Name: ATORVASTATIN 40 MG TABLET] 90 tablet 1     Sig: TAKE 1 TABLET BY MOUTH EVERY DAY       Medication is on current medication list Yes    Dosage and directions were verified? Yes    Quantity verified: 90 day supply     Pharmacy Verified?  Yes    Last Appointment:  12/10/2024    Future appts:  Future Appointments   Date Time Provider Department Center   1/15/2025  7:30 AM Je Montalvo DO [facility]   12/4/2025 11:15 AM Portillo Leiva DO [facility] None        (If no appt send self scheduling link. .REFILLAPPT)  Scheduling request sent?     [] Yes  [x] No    Does patient need updated?  [] Yes  [x] No

## 2025-01-01 DIAGNOSIS — L30.9 ECZEMA, UNSPECIFIED TYPE: ICD-10-CM

## 2025-01-02 NOTE — TELEPHONE ENCOUNTER
Requested Prescriptions     Pending Prescriptions Disp Refills    cetirizine (ZYRTEC) 10 MG tablet [Pharmacy Med Name: CETIRIZINE HCL 10 MG TABLET] 90 tablet 1     Sig: TAKE 1 TABLET BY MOUTH EVERY DAY       Next appt is 1/15/2025  Last appt was 12/10/2024

## 2025-01-04 RX ORDER — CETIRIZINE HYDROCHLORIDE 10 MG/1
10 TABLET ORAL DAILY
Qty: 90 TABLET | Refills: 1 | Status: SHIPPED | OUTPATIENT
Start: 2025-01-04

## 2025-01-13 ASSESSMENT — PATIENT HEALTH QUESTIONNAIRE - PHQ9
9. THOUGHTS THAT YOU WOULD BE BETTER OFF DEAD, OR OF HURTING YOURSELF: NOT AT ALL
SUM OF ALL RESPONSES TO PHQ QUESTIONS 1-9: 0
SUM OF ALL RESPONSES TO PHQ QUESTIONS 1-9: 0
8. MOVING OR SPEAKING SO SLOWLY THAT OTHER PEOPLE COULD HAVE NOTICED. OR THE OPPOSITE, BEING SO FIGETY OR RESTLESS THAT YOU HAVE BEEN MOVING AROUND A LOT MORE THAN USUAL: NOT AT ALL
5. POOR APPETITE OR OVEREATING: NOT AT ALL
1. LITTLE INTEREST OR PLEASURE IN DOING THINGS: NOT AT ALL
7. TROUBLE CONCENTRATING ON THINGS, SUCH AS READING THE NEWSPAPER OR WATCHING TELEVISION: NOT AT ALL
6. FEELING BAD ABOUT YOURSELF - OR THAT YOU ARE A FAILURE OR HAVE LET YOURSELF OR YOUR FAMILY DOWN: NOT AT ALL
8. MOVING OR SPEAKING SO SLOWLY THAT OTHER PEOPLE COULD HAVE NOTICED. OR THE OPPOSITE - BEING SO FIDGETY OR RESTLESS THAT YOU HAVE BEEN MOVING AROUND A LOT MORE THAN USUAL: NOT AT ALL
10. IF YOU CHECKED OFF ANY PROBLEMS, HOW DIFFICULT HAVE THESE PROBLEMS MADE IT FOR YOU TO DO YOUR WORK, TAKE CARE OF THINGS AT HOME, OR GET ALONG WITH OTHER PEOPLE: NOT DIFFICULT AT ALL
2. FEELING DOWN, DEPRESSED OR HOPELESS: NOT AT ALL
9. THOUGHTS THAT YOU WOULD BE BETTER OFF DEAD, OR OF HURTING YOURSELF: NOT AT ALL
SUM OF ALL RESPONSES TO PHQ QUESTIONS 1-9: 0
3. TROUBLE FALLING OR STAYING ASLEEP: NOT AT ALL
4. FEELING TIRED OR HAVING LITTLE ENERGY: NOT AT ALL
SUM OF ALL RESPONSES TO PHQ QUESTIONS 1-9: 0
6. FEELING BAD ABOUT YOURSELF - OR THAT YOU ARE A FAILURE OR HAVE LET YOURSELF OR YOUR FAMILY DOWN: NOT AT ALL
1. LITTLE INTEREST OR PLEASURE IN DOING THINGS: NOT AT ALL
7. TROUBLE CONCENTRATING ON THINGS, SUCH AS READING THE NEWSPAPER OR WATCHING TELEVISION: NOT AT ALL
5. POOR APPETITE OR OVEREATING: NOT AT ALL
10. IF YOU CHECKED OFF ANY PROBLEMS, HOW DIFFICULT HAVE THESE PROBLEMS MADE IT FOR YOU TO DO YOUR WORK, TAKE CARE OF THINGS AT HOME, OR GET ALONG WITH OTHER PEOPLE: NOT DIFFICULT AT ALL
4. FEELING TIRED OR HAVING LITTLE ENERGY: NOT AT ALL
3. TROUBLE FALLING OR STAYING ASLEEP: NOT AT ALL
SUM OF ALL RESPONSES TO PHQ QUESTIONS 1-9: 0
SUM OF ALL RESPONSES TO PHQ9 QUESTIONS 1 & 2: 0
2. FEELING DOWN, DEPRESSED OR HOPELESS: NOT AT ALL

## 2025-01-15 ENCOUNTER — OFFICE VISIT (OUTPATIENT)
Dept: PRIMARY CARE CLINIC | Age: 60
End: 2025-01-15

## 2025-01-15 VITALS
OXYGEN SATURATION: 96 % | DIASTOLIC BLOOD PRESSURE: 82 MMHG | HEIGHT: 63 IN | SYSTOLIC BLOOD PRESSURE: 120 MMHG | HEART RATE: 82 BPM | TEMPERATURE: 98 F | WEIGHT: 140 LBS | BODY MASS INDEX: 24.8 KG/M2

## 2025-01-15 DIAGNOSIS — E78.5 HYPERLIPIDEMIA, UNSPECIFIED HYPERLIPIDEMIA TYPE: ICD-10-CM

## 2025-01-15 DIAGNOSIS — E55.9 VITAMIN D DEFICIENCY: ICD-10-CM

## 2025-01-15 DIAGNOSIS — J06.9 VIRAL URI: ICD-10-CM

## 2025-01-15 DIAGNOSIS — Z00.00 WELL ADULT EXAM: Primary | ICD-10-CM

## 2025-01-15 LAB
ALBUMIN: 4.4 G/DL (ref 3.5–5.2)
ALP BLD-CCNC: 148 U/L (ref 35–104)
ALT SERPL-CCNC: 15 U/L (ref 0–32)
ANION GAP SERPL CALCULATED.3IONS-SCNC: 8 MMOL/L (ref 7–16)
AST SERPL-CCNC: 20 U/L (ref 0–31)
BILIRUB SERPL-MCNC: 0.3 MG/DL (ref 0–1.2)
BUN BLDV-MCNC: 16 MG/DL (ref 6–23)
CALCIUM SERPL-MCNC: 9.9 MG/DL (ref 8.6–10.2)
CHLORIDE BLD-SCNC: 105 MMOL/L (ref 98–107)
CHOLESTEROL, TOTAL: 187 MG/DL
CO2: 28 MMOL/L (ref 22–29)
CREAT SERPL-MCNC: 0.9 MG/DL (ref 0.5–1)
GFR, ESTIMATED: 76 ML/MIN/1.73M2
GLUCOSE FASTING: 85 MG/DL (ref 74–99)
HCT VFR BLD CALC: 42.9 % (ref 34–48)
HDLC SERPL-MCNC: 76 MG/DL
HEMOGLOBIN: 12.7 G/DL (ref 11.5–15.5)
LDL CHOLESTEROL: 84 MG/DL
MCH RBC QN AUTO: 25 PG (ref 26–35)
MCHC RBC AUTO-ENTMCNC: 29.6 G/DL (ref 32–34.5)
MCV RBC AUTO: 84.4 FL (ref 80–99.9)
PDW BLD-RTO: 13.9 % (ref 11.5–15)
PLATELET # BLD: 220 K/UL (ref 130–450)
PMV BLD AUTO: 12.1 FL (ref 7–12)
POTASSIUM SERPL-SCNC: 5.4 MMOL/L (ref 3.5–5)
RBC # BLD: 5.08 M/UL (ref 3.5–5.5)
SODIUM BLD-SCNC: 141 MMOL/L (ref 132–146)
TOTAL PROTEIN: 6.8 G/DL (ref 6.4–8.3)
TRIGL SERPL-MCNC: 135 MG/DL
VITAMIN D 25-HYDROXY: 45.4 NG/ML (ref 30–100)
VLDLC SERPL CALC-MCNC: 27 MG/DL
WBC # BLD: 7.5 K/UL (ref 4.5–11.5)

## 2025-01-15 SDOH — ECONOMIC STABILITY: FOOD INSECURITY: WITHIN THE PAST 12 MONTHS, THE FOOD YOU BOUGHT JUST DIDN'T LAST AND YOU DIDN'T HAVE MONEY TO GET MORE.: NEVER TRUE

## 2025-01-15 SDOH — ECONOMIC STABILITY: FOOD INSECURITY: WITHIN THE PAST 12 MONTHS, YOU WORRIED THAT YOUR FOOD WOULD RUN OUT BEFORE YOU GOT MONEY TO BUY MORE.: NEVER TRUE

## 2025-01-15 ASSESSMENT — ENCOUNTER SYMPTOMS
PHOTOPHOBIA: 0
BLOOD IN STOOL: 0
SHORTNESS OF BREATH: 0
COUGH: 1
VOMITING: 0
WHEEZING: 0
FACIAL SWELLING: 0
RHINORRHEA: 0
NAUSEA: 0
COLOR CHANGE: 0
EYE DISCHARGE: 0
ABDOMINAL DISTENTION: 0
ABDOMINAL PAIN: 0
CONSTIPATION: 0
EYE ITCHING: 0
DIARRHEA: 0
EYE PAIN: 0
SORE THROAT: 0
SINUS PRESSURE: 1

## 2025-01-15 NOTE — PROGRESS NOTES
Venipuncture was obtained from left arm. Patient tolerated the procedure without complications or complaints.  
  Musculoskeletal:  Negative for arthralgias, joint swelling and myalgias.   Skin:  Negative for color change and rash.   Allergic/Immunologic: Negative for environmental allergies and food allergies.   Neurological:  Negative for dizziness, seizures, syncope, weakness, numbness and headaches.   Psychiatric/Behavioral:  Negative for confusion, hallucinations and suicidal ideas. The patient is not nervous/anxious.         Immunization History   Administered Date(s) Administered    COVID-19, MODERNA BLUE border, Primary or Immunocompromised, (age 12y+), IM, 100 mcg/0.5mL 03/30/2021, 04/27/2021    Influenza Virus Vaccine 10/12/2015, 10/30/2016    Influenza, FLUARIX, FLULAVAL, FLUZONE (age 6 mo+) and AFLURIA, (age 3 y+), Quadv PF, 0.5mL 11/06/2017, 10/22/2019, 10/26/2020, 12/15/2021, 11/01/2022    Influenza, FLUCELVAX, (age 6 mo+) IM, Trivalent PF, 0.5mL 12/06/2024    Influenza, FLUCELVAX, (age 6 mo+), MDCK, Quadv PF, 0.5mL 10/29/2018, 10/31/2023    Pneumococcal, PPSV23, PNEUMOVAX 23, (age 2y+), SC/IM, 0.5mL 09/17/2020    TDaP, ADACEL (age 10y-64y), BOOSTRIX (age 10y+), IM, 0.5mL 02/20/2021        Objective   /82 (Position: Sitting)   Pulse 82   Temp 98 °F (36.7 °C) (Temporal)   Ht 1.6 m (5' 3\")   Wt 63.5 kg (140 lb)   SpO2 96%   BMI 24.80 kg/m²   Current Outpatient Medications   Medication Sig Dispense Refill    cetirizine (ZYRTEC) 10 MG tablet TAKE 1 TABLET BY MOUTH EVERY DAY 90 tablet 1    atorvastatin (LIPITOR) 40 MG tablet TAKE 1 TABLET BY MOUTH EVERY DAY 90 tablet 1    budesonide-formoterol (SYMBICORT) 160-4.5 MCG/ACT AERO Inhale 2 puffs into the lungs in the morning and at bedtime 10.2 each 5    Albuterol-Budesonide (AIRSUPRA) 90-80 MCG/ACT AERO 2 inhalations, 5 times a day, as needed for rescue/shortness of breath 10.7 g 5    tiotropium (SPIRIVA RESPIMAT) 1.25 MCG/ACT AERS inhaler Inhale 2 puffs into the lungs daily 1 each 5    triamcinolone (KENALOG) 0.1 % cream Apply topically 2 times daily prn

## 2025-03-12 ENCOUNTER — HOSPITAL ENCOUNTER (EMERGENCY)
Age: 60
Discharge: HOME | End: 2025-03-12
Attending: EMERGENCY MEDICINE
Payer: COMMERCIAL

## 2025-03-12 VITALS
DIASTOLIC BLOOD PRESSURE: 76 MMHG | HEIGHT: 63 IN | WEIGHT: 140 LBS | RESPIRATION RATE: 18 BRPM | HEART RATE: 85 BPM | TEMPERATURE: 98.6 F | BODY MASS INDEX: 24.8 KG/M2 | SYSTOLIC BLOOD PRESSURE: 110 MMHG | OXYGEN SATURATION: 97 %

## 2025-03-12 DIAGNOSIS — J06.9 ACUTE UPPER RESPIRATORY INFECTION: Primary | ICD-10-CM

## 2025-03-12 DIAGNOSIS — J45.901 EXACERBATION OF ASTHMA, UNSPECIFIED ASTHMA SEVERITY, UNSPECIFIED WHETHER PERSISTENT: ICD-10-CM

## 2025-03-12 PROCEDURE — 6360000002 HC RX W HCPCS: Performed by: EMERGENCY MEDICINE

## 2025-03-12 PROCEDURE — 99284 EMERGENCY DEPT VISIT MOD MDM: CPT

## 2025-03-12 PROCEDURE — 96372 THER/PROPH/DIAG INJ SC/IM: CPT

## 2025-03-12 RX ORDER — BROMPHENIRAMINE MALEATE, PSEUDOEPHEDRINE HYDROCHLORIDE, AND DEXTROMETHORPHAN HYDROBROMIDE 2; 30; 10 MG/5ML; MG/5ML; MG/5ML
5 SYRUP ORAL 4 TIMES DAILY PRN
Qty: 120 ML | Refills: 0 | Status: SHIPPED | OUTPATIENT
Start: 2025-03-12

## 2025-03-12 RX ORDER — DEXAMETHASONE SODIUM PHOSPHATE 10 MG/ML
10 INJECTION, SOLUTION INTRAMUSCULAR; INTRAVENOUS ONCE
Status: COMPLETED | OUTPATIENT
Start: 2025-03-12 | End: 2025-03-12

## 2025-03-12 RX ORDER — DOXYCYCLINE HYCLATE 100 MG
100 TABLET ORAL 2 TIMES DAILY
Qty: 20 TABLET | Refills: 0 | Status: SHIPPED | OUTPATIENT
Start: 2025-03-12 | End: 2025-03-22

## 2025-03-12 RX ORDER — ALBUTEROL SULFATE 0.83 MG/ML
2.5 SOLUTION RESPIRATORY (INHALATION) ONCE
Status: COMPLETED | OUTPATIENT
Start: 2025-03-12 | End: 2025-03-12

## 2025-03-12 RX ORDER — METHYLPREDNISOLONE 4 MG/1
TABLET ORAL
COMMUNITY
Start: 2025-03-06

## 2025-03-12 RX ADMIN — ALBUTEROL SULFATE 2.5 MG: 2.5 SOLUTION RESPIRATORY (INHALATION) at 10:16

## 2025-03-12 RX ADMIN — DEXAMETHASONE SODIUM PHOSPHATE 10 MG: 10 INJECTION INTRAMUSCULAR; INTRAVENOUS at 10:16

## 2025-03-12 ASSESSMENT — ENCOUNTER SYMPTOMS
EYE DISCHARGE: 0
NAUSEA: 0
VOMITING: 0
COUGH: 0
SHORTNESS OF BREATH: 0
ABDOMINAL DISTENTION: 0
EYE PAIN: 0
SORE THROAT: 0
DIARRHEA: 0
SINUS PRESSURE: 0
BACK PAIN: 0
EYE REDNESS: 0
WHEEZING: 1

## 2025-03-12 ASSESSMENT — PAIN - FUNCTIONAL ASSESSMENT: PAIN_FUNCTIONAL_ASSESSMENT: NONE - DENIES PAIN

## 2025-03-12 NOTE — ED PROVIDER NOTES
day, as needed for rescue/shortness of breath    ATORVASTATIN (LIPITOR) 40 MG TABLET    TAKE 1 TABLET BY MOUTH EVERY DAY    BUDESONIDE-FORMOTEROL (SYMBICORT) 160-4.5 MCG/ACT AERO    Inhale 2 puffs into the lungs in the morning and at bedtime    CALCIUM CARBONATE (OSCAL) 500 MG TABS TABLET    Take 1 tablet by mouth daily    CETIRIZINE (ZYRTEC) 10 MG TABLET    TAKE 1 TABLET BY MOUTH EVERY DAY    CHOLECALCIFEROL (VITAMIN D) 2000 UNITS CAPS CAPSULE    Take  by mouth daily.    DEXILANT 60 MG CPDR DELAYED RELEASE CAPSULE    Take 1 capsule by mouth daily    MECLIZINE (ANTIVERT) 25 MG TABLET    TAKE 1 TABLET BY MOUTH 3 TIMES A DAY AS NEEDED DIZZINESS    METHYLPREDNISOLONE (MEDROL DOSEPACK) 4 MG TABLET    TAKE 6 TABLETS ON DAY 1 AS DIRECTED ON PACKAGE AND DECREASE BY 1 TAB EACH DAY FOR A TOTAL OF 6 DAYS    MONTELUKAST (SINGULAIR) 10 MG TABLET    Take 1 tablet by mouth nightly    TIOTROPIUM (SPIRIVA RESPIMAT) 1.25 MCG/ACT AERS INHALER    Inhale 2 puffs into the lungs daily    TRIAMCINOLONE (KENALOG) 0.1 % CREAM    Apply topically 2 times daily prn rash.    VENLAFAXINE (EFFEXOR XR) 75 MG EXTENDED RELEASE CAPSULE    TAKE 1 CAPSULE BY MOUTH EVERY DAY   Modified Medications    No medications on file   Discontinued Medications    No medications on file         Diagnosis:  1. Acute upper respiratory infection    2. Exacerbation of asthma, unspecified asthma severity, unspecified whether persistent        Disposition:  Patient's disposition: Discharge to home  Patient's condition is stable.                    Vic Dunbar MD  03/12/25 1037

## 2025-06-21 DIAGNOSIS — E78.5 HYPERLIPIDEMIA, UNSPECIFIED HYPERLIPIDEMIA TYPE: ICD-10-CM

## 2025-06-23 NOTE — TELEPHONE ENCOUNTER
Name of Medication(s) Requested:  Requested Prescriptions     Pending Prescriptions Disp Refills    atorvastatin (LIPITOR) 40 MG tablet [Pharmacy Med Name: ATORVASTATIN 40 MG TABLET] 90 tablet 1     Sig: TAKE 1 TABLET BY MOUTH EVERY DAY       Medication is on current medication list Yes    Dosage and directions were verified? Yes    Quantity verified: 90 day supply     Pharmacy Verified?  Yes    Last Appointment:  1/15/2025    Future appts:  Future Appointments   Date Time Provider Department Center   7/16/2025  7:30 AM Je Montalvo DO [facility]   12/4/2025 11:15 AM Portillo Leiva DO [facility] None        (If no appt send self scheduling link. .REFILLAPPT)  Scheduling request sent?     [] Yes  [x] No    Does patient need updated?  [] Yes  [x] No

## 2025-06-26 RX ORDER — ATORVASTATIN CALCIUM 40 MG/1
40 TABLET, FILM COATED ORAL DAILY
Qty: 90 TABLET | Refills: 1 | Status: SHIPPED | OUTPATIENT
Start: 2025-06-26

## 2025-07-16 ENCOUNTER — OFFICE VISIT (OUTPATIENT)
Dept: PRIMARY CARE CLINIC | Age: 60
End: 2025-07-16
Payer: COMMERCIAL

## 2025-07-16 VITALS
TEMPERATURE: 98 F | OXYGEN SATURATION: 98 % | HEART RATE: 81 BPM | WEIGHT: 131 LBS | SYSTOLIC BLOOD PRESSURE: 112 MMHG | BODY MASS INDEX: 23.21 KG/M2 | DIASTOLIC BLOOD PRESSURE: 76 MMHG | HEIGHT: 63 IN

## 2025-07-16 DIAGNOSIS — E55.9 VITAMIN D DEFICIENCY: ICD-10-CM

## 2025-07-16 DIAGNOSIS — G90.89 HARLEQUIN SYNDROME: ICD-10-CM

## 2025-07-16 DIAGNOSIS — H81.10 BENIGN PAROXYSMAL POSITIONAL VERTIGO, UNSPECIFIED LATERALITY: ICD-10-CM

## 2025-07-16 DIAGNOSIS — L30.9 ECZEMA, UNSPECIFIED TYPE: ICD-10-CM

## 2025-07-16 DIAGNOSIS — G51.8 FACIAL NEURALGIA: ICD-10-CM

## 2025-07-16 DIAGNOSIS — E78.5 HYPERLIPIDEMIA, UNSPECIFIED HYPERLIPIDEMIA TYPE: Primary | ICD-10-CM

## 2025-07-16 LAB
ALBUMIN: 4.2 G/DL (ref 3.5–5.2)
ALP BLD-CCNC: 127 U/L (ref 35–104)
ALT SERPL-CCNC: 15 U/L (ref 0–35)
ANION GAP SERPL CALCULATED.3IONS-SCNC: 13 MMOL/L (ref 7–16)
AST SERPL-CCNC: 28 U/L (ref 0–35)
BILIRUB SERPL-MCNC: 0.4 MG/DL (ref 0–1.2)
BUN BLDV-MCNC: 12 MG/DL (ref 8–23)
CALCIUM SERPL-MCNC: 10.6 MG/DL (ref 8.8–10.2)
CHLORIDE BLD-SCNC: 105 MMOL/L (ref 98–107)
CHOLESTEROL, TOTAL: 178 MG/DL
CO2: 24 MMOL/L (ref 22–29)
CREAT SERPL-MCNC: 0.9 MG/DL (ref 0.5–1)
GFR, ESTIMATED: 70 ML/MIN/1.73M2
GLUCOSE FASTING: 101 MG/DL (ref 74–99)
HCT VFR BLD CALC: 44.3 % (ref 34–48)
HDLC SERPL-MCNC: 86 MG/DL
HEMOGLOBIN: 13.6 G/DL (ref 11.5–15.5)
LDL CHOLESTEROL: 57 MG/DL
MCH RBC QN AUTO: 25.3 PG (ref 26–35)
MCHC RBC AUTO-ENTMCNC: 30.7 G/DL (ref 32–34.5)
MCV RBC AUTO: 82.3 FL (ref 80–99.9)
PDW BLD-RTO: 14.7 % (ref 11.5–15)
PLATELET # BLD: 243 K/UL (ref 130–450)
PMV BLD AUTO: 11.8 FL (ref 7–12)
POTASSIUM SERPL-SCNC: 6.2 MMOL/L (ref 3.5–5.1)
RBC # BLD: 5.38 M/UL (ref 3.5–5.5)
SODIUM BLD-SCNC: 141 MMOL/L (ref 136–145)
TOTAL PROTEIN: 6.9 G/DL (ref 6.4–8.3)
TRIGL SERPL-MCNC: 174 MG/DL
TSH SERPL DL<=0.05 MIU/L-ACNC: 2.23 UIU/ML (ref 0.27–4.2)
VITAMIN D 25-HYDROXY: 43.7 NG/ML (ref 30–100)
VLDLC SERPL CALC-MCNC: 35 MG/DL
WBC # BLD: 7.2 K/UL (ref 4.5–11.5)

## 2025-07-16 PROCEDURE — 99214 OFFICE O/P EST MOD 30 MIN: CPT | Performed by: FAMILY MEDICINE

## 2025-07-16 PROCEDURE — G2211 COMPLEX E/M VISIT ADD ON: HCPCS | Performed by: FAMILY MEDICINE

## 2025-07-16 PROCEDURE — 36415 COLL VENOUS BLD VENIPUNCTURE: CPT | Performed by: FAMILY MEDICINE

## 2025-07-16 RX ORDER — MONTELUKAST SODIUM 10 MG/1
10 TABLET ORAL NIGHTLY
Qty: 90 TABLET | Refills: 3 | Status: SHIPPED | OUTPATIENT
Start: 2025-07-16

## 2025-07-16 ASSESSMENT — ENCOUNTER SYMPTOMS
BLOOD IN STOOL: 0
PHOTOPHOBIA: 0
DIARRHEA: 0
VOMITING: 0
COLOR CHANGE: 0
SORE THROAT: 0
SHORTNESS OF BREATH: 0
EYE ITCHING: 0
WHEEZING: 0
SINUS PRESSURE: 0
NAUSEA: 0
FACIAL SWELLING: 0
COUGH: 0
ABDOMINAL DISTENTION: 0
RHINORRHEA: 0
EYE PAIN: 0
ABDOMINAL PAIN: 0
EYE DISCHARGE: 0
CONSTIPATION: 0

## 2025-07-16 NOTE — PROGRESS NOTES
Tuyet Colunga (:  1965) is a 60 y.o. female,Established patient, here for evaluation of the following chief complaint(s):  6 Month Follow-Up (Labs Pt overall feels well and she offers no complaints)      Assessment & Plan   ASSESSMENT/PLAN:  1. Hyperlipidemia, unspecified hyperlipidemia type  -     Comprehensive Metabolic Panel, Fasting  -     Lipid Panel  2. Vitamin D deficiency  -     Vitamin D 25 Hydroxy  3. Facial neuralgia  -     CBC  -     Comprehensive Metabolic Panel, Fasting  -     TSH  4. Eczema, unspecified type  -     CBC  -     Comprehensive Metabolic Panel, Fasting  5. Benign paroxysmal positional vertigo, unspecified laterality  -     CBC  -     TSH  6. Harlequin syndrome          PLAN:  Mammogram 2024 reviewed.    Patient was again advised to schedule a colonoscopy with Dr. Nice.   Labs ordered.  Taking her 4-year-old grandson to [place] next week    : I provide continuity of care as the listed primary care practitioner and serve as the continual focal point for this patient's health care needs         Return in about 6 months (around 2026) for AWV, Labs.         Subjective   SUBJECTIVE/OBJECTIVE:  Patient here for a 6-month follow-up and labs.  She offers no complaints at the present time.        Review of Systems   Constitutional:  Negative for chills, fatigue and fever.   HENT:  Negative for congestion, ear discharge, ear pain, facial swelling, hearing loss, nosebleeds, rhinorrhea, sinus pressure and sore throat.    Eyes:  Negative for photophobia, pain, discharge, itching and visual disturbance.   Respiratory:  Negative for cough, shortness of breath and wheezing.    Cardiovascular:  Negative for chest pain, palpitations and leg swelling.   Gastrointestinal:  Negative for abdominal distention, abdominal pain, blood in stool, constipation, diarrhea, nausea and vomiting.   Endocrine: Negative for polydipsia, polyphagia and polyuria.   Genitourinary:  Negative for

## 2025-07-21 ENCOUNTER — RESULTS FOLLOW-UP (OUTPATIENT)
Dept: PRIMARY CARE CLINIC | Age: 60
End: 2025-07-21

## 2025-07-21 DIAGNOSIS — E87.5 HYPERKALEMIA: Primary | ICD-10-CM

## (undated) DEVICE — ENCORE® LATEX TEXTURED SIZE 8, STERILE LATEX POWDER-FREE SURGICAL GLOVE: Brand: ENCORE

## (undated) DEVICE — SOLUTION IV IRRIG POUR BRL 0.9% SODIUM CHL 2F7124

## (undated) DEVICE — NEEDLE HYPO 25GA L1.5IN BLU POLYPR HUB S STL REG BVL STR

## (undated) DEVICE — HOOK LOCK LATEX FREE ELASTIC BANDAGE 3INX5YD

## (undated) DEVICE — GOWN SURG XL SMS FAB NONREINFORCED RAGLAN SLV HK LOOP CLSR

## (undated) DEVICE — ELECTRODE PT RET AD L9FT HI MOIST COND ADH HYDRGEL CORDED

## (undated) DEVICE — BANDAGE COMPR W4XL108IN WHT LAYERED NO CLSR SYN RUB ESMARCH

## (undated) DEVICE — BANDAGE GZ W2XL75IN ST RAYON POLY CNFRM STRTCH LTWT

## (undated) DEVICE — PEN: MARKING STD 100/CS: Brand: MEDICAL ACTION INDUSTRIES

## (undated) DEVICE — 1810 FOAM BLOCK NEEDLE COUNTER: Brand: DEVON

## (undated) DEVICE — TOWEL OR BLUEE 16X26IN ST 8 PACK ORB08 16X26ORTWL

## (undated) DEVICE — HOOK LOCK LATEX FREE ELASTIC BANDAGE 2INX5YD

## (undated) DEVICE — ELECTRODE NDL 2.8IN COAT VALLEYLAB

## (undated) DEVICE — HANDLE CVR PATENTED RETENTION DISC STRL LIGHT SHLD

## (undated) DEVICE — GAUZE,SPONGE,4"X4",16PLY,XRAY,STRL,LF: Brand: MEDLINE

## (undated) DEVICE — BASIC PACK: Brand: CONVERTORS

## (undated) DEVICE — GLOVE SURG 7 LTX TRIFLEX WIDE FINGER PWDR

## (undated) DEVICE — BLADE CLIPPER GEN PURP NS

## (undated) DEVICE — CONMED GOLDLINE ELECTROSURGICAL HANDPIECE, HAND CONTROLLED WITH BLADE ELECTRODE, BUTTON SWITCH, SAFETY HOLSTER AND 10 FT (3 M) CABLE: Brand: CONMED GOLDLINE

## (undated) DEVICE — DRESSING GZ XRFRM 4X4(25/BX 6BX/CS)

## (undated) DEVICE — INTENDED FOR TISSUE SEPARATION, AND OTHER PROCEDURES THAT REQUIRE A SHARP SURGICAL BLADE TO PUNCTURE OR CUT.: Brand: BARD-PARKER ® STAINLESS STEEL BLADES

## (undated) DEVICE — 20 ML SYRINGE REGULAR TIP: Brand: MONOJECT

## (undated) DEVICE — TIBURON EXTREMITY SHEET: Brand: CONVERTORS